# Patient Record
Sex: FEMALE | Race: WHITE | NOT HISPANIC OR LATINO | URBAN - METROPOLITAN AREA
[De-identification: names, ages, dates, MRNs, and addresses within clinical notes are randomized per-mention and may not be internally consistent; named-entity substitution may affect disease eponyms.]

---

## 2018-04-10 ENCOUNTER — OUTPATIENT (OUTPATIENT)
Dept: OUTPATIENT SERVICES | Facility: HOSPITAL | Age: 63
LOS: 1 days | Discharge: HOME | End: 2018-04-10

## 2018-04-19 ENCOUNTER — INPATIENT (INPATIENT)
Facility: HOSPITAL | Age: 63
LOS: 3 days | Discharge: HOME | DRG: 863 | End: 2018-04-23
Attending: INTERNAL MEDICINE | Admitting: INTERNAL MEDICINE
Payer: MEDICARE

## 2018-04-19 VITALS
TEMPERATURE: 98 F | RESPIRATION RATE: 18 BRPM | HEART RATE: 75 BPM | OXYGEN SATURATION: 98 % | HEIGHT: 66 IN | DIASTOLIC BLOOD PRESSURE: 84 MMHG | SYSTOLIC BLOOD PRESSURE: 134 MMHG | WEIGHT: 179.02 LBS

## 2018-04-19 DIAGNOSIS — I10 ESSENTIAL (PRIMARY) HYPERTENSION: ICD-10-CM

## 2018-04-19 DIAGNOSIS — Z29.9 ENCOUNTER FOR PROPHYLACTIC MEASURES, UNSPECIFIED: ICD-10-CM

## 2018-04-19 DIAGNOSIS — Z98.890 OTHER SPECIFIED POSTPROCEDURAL STATES: Chronic | ICD-10-CM

## 2018-04-19 DIAGNOSIS — T81.9XXA UNSPECIFIED COMPLICATION OF PROCEDURE, INITIAL ENCOUNTER: ICD-10-CM

## 2018-04-19 DIAGNOSIS — R63.8 OTHER SYMPTOMS AND SIGNS CONCERNING FOOD AND FLUID INTAKE: ICD-10-CM

## 2018-04-19 LAB
ALBUMIN SERPL ELPH-MCNC: 4.3 G/DL — SIGNIFICANT CHANGE UP (ref 3.3–5)
ALP SERPL-CCNC: 77 U/L — SIGNIFICANT CHANGE UP (ref 40–120)
ALT FLD-CCNC: 16 U/L — SIGNIFICANT CHANGE UP (ref 10–45)
ANION GAP SERPL CALC-SCNC: 11 MMOL/L — SIGNIFICANT CHANGE UP (ref 5–17)
APTT BLD: 34.3 SEC — SIGNIFICANT CHANGE UP (ref 27.5–37.4)
AST SERPL-CCNC: 32 U/L — SIGNIFICANT CHANGE UP (ref 10–40)
BASOPHILS NFR BLD AUTO: 0.2 % — SIGNIFICANT CHANGE UP (ref 0–2)
BILIRUB SERPL-MCNC: 0.3 MG/DL — SIGNIFICANT CHANGE UP (ref 0.2–1.2)
BLD GP AB SCN SERPL QL: NEGATIVE — SIGNIFICANT CHANGE UP
BUN SERPL-MCNC: 14 MG/DL — SIGNIFICANT CHANGE UP (ref 7–23)
CALCIUM SERPL-MCNC: 9.6 MG/DL — SIGNIFICANT CHANGE UP (ref 8.4–10.5)
CHLORIDE SERPL-SCNC: 100 MMOL/L — SIGNIFICANT CHANGE UP (ref 96–108)
CO2 SERPL-SCNC: 29 MMOL/L — SIGNIFICANT CHANGE UP (ref 22–31)
CREAT SERPL-MCNC: 0.6 MG/DL — SIGNIFICANT CHANGE UP (ref 0.5–1.3)
CRP SERPL-MCNC: 0.22 MG/DL — SIGNIFICANT CHANGE UP (ref 0–0.4)
EOSINOPHIL NFR BLD AUTO: 2.4 % — SIGNIFICANT CHANGE UP (ref 0–6)
ERYTHROCYTE [SEDIMENTATION RATE] IN BLOOD: 12 MM/HR — SIGNIFICANT CHANGE UP
GLUCOSE SERPL-MCNC: 132 MG/DL — HIGH (ref 70–99)
HCT VFR BLD CALC: 43.4 % — SIGNIFICANT CHANGE UP (ref 34.5–45)
HGB BLD-MCNC: 14.8 G/DL — SIGNIFICANT CHANGE UP (ref 11.5–15.5)
INR BLD: 0.97 — SIGNIFICANT CHANGE UP (ref 0.88–1.16)
LYMPHOCYTES # BLD AUTO: 29.8 % — SIGNIFICANT CHANGE UP (ref 13–44)
MCHC RBC-ENTMCNC: 32.2 PG — SIGNIFICANT CHANGE UP (ref 27–34)
MCHC RBC-ENTMCNC: 34.1 G/DL — SIGNIFICANT CHANGE UP (ref 32–36)
MCV RBC AUTO: 94.6 FL — SIGNIFICANT CHANGE UP (ref 80–100)
MONOCYTES NFR BLD AUTO: 6.6 % — SIGNIFICANT CHANGE UP (ref 2–14)
NEUTROPHILS NFR BLD AUTO: 61 % — SIGNIFICANT CHANGE UP (ref 43–77)
PLATELET # BLD AUTO: 177 K/UL — SIGNIFICANT CHANGE UP (ref 150–400)
POTASSIUM SERPL-MCNC: 4 MMOL/L — SIGNIFICANT CHANGE UP (ref 3.5–5.3)
POTASSIUM SERPL-SCNC: 4 MMOL/L — SIGNIFICANT CHANGE UP (ref 3.5–5.3)
PROT SERPL-MCNC: 6.8 G/DL — SIGNIFICANT CHANGE UP (ref 6–8.3)
PROTHROM AB SERPL-ACNC: 10.8 SEC — SIGNIFICANT CHANGE UP (ref 9.8–12.7)
RBC # BLD: 4.59 M/UL — SIGNIFICANT CHANGE UP (ref 3.8–5.2)
RBC # FLD: 12.2 % — SIGNIFICANT CHANGE UP (ref 10.3–16.9)
RH IG SCN BLD-IMP: POSITIVE — SIGNIFICANT CHANGE UP
SODIUM SERPL-SCNC: 140 MMOL/L — SIGNIFICANT CHANGE UP (ref 135–145)
WBC # BLD: 5.3 K/UL — SIGNIFICANT CHANGE UP (ref 3.8–10.5)
WBC # FLD AUTO: 5.3 K/UL — SIGNIFICANT CHANGE UP (ref 3.8–10.5)

## 2018-04-19 PROCEDURE — 71046 X-RAY EXAM CHEST 2 VIEWS: CPT | Mod: 26

## 2018-04-19 PROCEDURE — 99285 EMERGENCY DEPT VISIT HI MDM: CPT

## 2018-04-19 PROCEDURE — 73630 X-RAY EXAM OF FOOT: CPT | Mod: 26,LT

## 2018-04-19 RX ORDER — PIPERACILLIN AND TAZOBACTAM 4; .5 G/20ML; G/20ML
3.38 INJECTION, POWDER, LYOPHILIZED, FOR SOLUTION INTRAVENOUS EVERY 6 HOURS
Qty: 0 | Refills: 0 | Status: DISCONTINUED | OUTPATIENT
Start: 2018-04-19 | End: 2018-04-22

## 2018-04-19 RX ORDER — PIPERACILLIN AND TAZOBACTAM 4; .5 G/20ML; G/20ML
3.38 INJECTION, POWDER, LYOPHILIZED, FOR SOLUTION INTRAVENOUS ONCE
Qty: 0 | Refills: 0 | Status: COMPLETED | OUTPATIENT
Start: 2018-04-19 | End: 2018-04-19

## 2018-04-19 RX ORDER — MORPHINE SULFATE 50 MG/1
4 CAPSULE, EXTENDED RELEASE ORAL ONCE
Qty: 0 | Refills: 0 | Status: DISCONTINUED | OUTPATIENT
Start: 2018-04-19 | End: 2018-04-19

## 2018-04-19 RX ORDER — VALSARTAN 80 MG/1
1 TABLET ORAL
Qty: 0 | Refills: 0 | COMMUNITY

## 2018-04-19 RX ORDER — VANCOMYCIN HCL 1 G
1250 VIAL (EA) INTRAVENOUS EVERY 12 HOURS
Qty: 0 | Refills: 0 | Status: DISCONTINUED | OUTPATIENT
Start: 2018-04-20 | End: 2018-04-20

## 2018-04-19 RX ORDER — VALSARTAN 80 MG/1
40 TABLET ORAL DAILY
Qty: 0 | Refills: 0 | Status: DISCONTINUED | OUTPATIENT
Start: 2018-04-20 | End: 2018-04-23

## 2018-04-19 RX ORDER — VANCOMYCIN HCL 1 G
1000 VIAL (EA) INTRAVENOUS ONCE
Qty: 0 | Refills: 0 | Status: COMPLETED | OUTPATIENT
Start: 2018-04-19 | End: 2018-04-19

## 2018-04-19 RX ORDER — DOCUSATE SODIUM 100 MG
100 CAPSULE ORAL DAILY
Qty: 0 | Refills: 0 | Status: DISCONTINUED | OUTPATIENT
Start: 2018-04-19 | End: 2018-04-23

## 2018-04-19 RX ORDER — MORPHINE SULFATE 50 MG/1
2 CAPSULE, EXTENDED RELEASE ORAL ONCE
Qty: 0 | Refills: 0 | Status: DISCONTINUED | OUTPATIENT
Start: 2018-04-19 | End: 2018-04-19

## 2018-04-19 RX ORDER — ACETAMINOPHEN 500 MG
650 TABLET ORAL EVERY 6 HOURS
Qty: 0 | Refills: 0 | Status: DISCONTINUED | OUTPATIENT
Start: 2018-04-19 | End: 2018-04-23

## 2018-04-19 RX ADMIN — PIPERACILLIN AND TAZOBACTAM 200 GRAM(S): 4; .5 INJECTION, POWDER, LYOPHILIZED, FOR SOLUTION INTRAVENOUS at 13:49

## 2018-04-19 RX ADMIN — Medication 650 MILLIGRAM(S): at 23:39

## 2018-04-19 RX ADMIN — PIPERACILLIN AND TAZOBACTAM 200 GRAM(S): 4; .5 INJECTION, POWDER, LYOPHILIZED, FOR SOLUTION INTRAVENOUS at 19:35

## 2018-04-19 RX ADMIN — Medication 250 MILLIGRAM(S): at 14:32

## 2018-04-19 RX ADMIN — MORPHINE SULFATE 4 MILLIGRAM(S): 50 CAPSULE, EXTENDED RELEASE ORAL at 13:49

## 2018-04-19 RX ADMIN — MORPHINE SULFATE 4 MILLIGRAM(S): 50 CAPSULE, EXTENDED RELEASE ORAL at 14:33

## 2018-04-19 RX ADMIN — MORPHINE SULFATE 2 MILLIGRAM(S): 50 CAPSULE, EXTENDED RELEASE ORAL at 16:56

## 2018-04-19 RX ADMIN — Medication 650 MILLIGRAM(S): at 16:56

## 2018-04-19 NOTE — ED ADULT TRIAGE NOTE - CHIEF COMPLAINT QUOTE
PT directed to ED for Abx for Cellulitis to Left First Toe.  Pt states "I've been on Augmentin but I guess its not working."  PT denies Dizziness, N/V/D, SOB, Fevers and CP.

## 2018-04-19 NOTE — H&P ADULT - ASSESSMENT
63yo F w/ PMHx of HTN presented for L toe pain, being admitted for post-surgical infection s/p hallux IPJ fusion and hammertoe correction.

## 2018-04-19 NOTE — ED ADULT NURSE NOTE - OBJECTIVE STATEMENT
PT came to ED under direction of PCP for cellulitis of left 1st toe and toe pain. Pt had recent surgery on left 1st, 2nd, 3rd toes.  1st toe is swollen, painful.  Pt denies fever, chills, dizziness, nausea, vomiting. Positive PMS x4 extremities. Pt on augmentin.  Pt denies all other medical complaints at this time.

## 2018-04-19 NOTE — ED PROVIDER NOTE - MUSCULOSKELETAL, MLM
L foot: pedal pulse 2+, external fixators to 2nd-4th digits, 1st digit w/mod swelling, + erythema, + warmth, sutures in place, nail L foot: pedal pulse 2+, external fixators to 2nd-4th digits, 1st digit w/mod swelling, + erythema, + warmth, sutures in place, nail partially excised, + purulent discharge, + light touch, no ascending lymphangitis

## 2018-04-19 NOTE — ED CLERICAL - NS ED CLERK NOTE PRE-ARRIVAL INFORMATION; ADDITIONAL PRE-ARRIVAL INFORMATION
61 Y/O F SHEY STOCK BEING SRNT IN BY THEODORE SANTOYO FOR POST OP INFECTION LEFT BIG TOE WAS ON ANTIBIOTICS FAILED DR KESSLER PT PLEASE CALL PODIATRY RESIDENT

## 2018-04-19 NOTE — ED PROVIDER NOTE - OBJECTIVE STATEMENT
The pt is a 61 y/o F, who was sent to ED by podiatry - The pt is a 61 y/o F, who was sent to ED by podiatry for post op inf - had toe fusion surg on 4/10 by dr piña, 2 d ago L big toe got red and swollen - was placed on augmentin 875 mg, today toe looking worse hence sent to ED. Pt c/o 8/10, constant, throbbing pain, + discharge from big toe. Denies fevers, chills, injury, streaking up the leg, calf pain or swelling

## 2018-04-19 NOTE — ED ADULT NURSE NOTE - CHPI ED SYMPTOMS NEG
no bruising/no scaly patches on skin/no decreased eating/drinking/no chills/no vomiting/no petechia/no fever

## 2018-04-19 NOTE — H&P ADULT - NSHPPHYSICALEXAM_GEN_ALL_CORE
.  VITAL SIGNS:  T(C): 36.5 (04-19-18 @ 16:12), Max: 36.6 (04-19-18 @ 12:43)  T(F): 97.7 (04-19-18 @ 16:12), Max: 97.9 (04-19-18 @ 12:43)  HR: 68 (04-19-18 @ 16:12) (68 - 75)  BP: 131/79 (04-19-18 @ 16:12) (131/79 - 134/84)  BP(mean): --  RR: 18 (04-19-18 @ 16:12) (18 - 18)  SpO2: 98% (04-19-18 @ 16:12) (98% - 98%)  Wt(kg): --    PHYSICAL EXAM:    Constitutional: WDWN resting comfortably in bed; NAD  Head: NC/AT  Eyes: PERRL, EOMI  ENT: no nasal discharge; uvula midline, no oropharyngeal erythema or exudates; MMM  Neck: supple; no JVD or thyromegaly  Respiratory: CTA B/L; no W/R/R, no retractions  Cardiac: +S1/S2; RRR; no M/R/G; PMI non-displaced  Gastrointestinal: soft, NT/ND; no rebound or guarding; +BSx4  Back: spine midline, no bony tenderness or step-offs; no CVAT B/L  Extremities: WWP, L foot in boot with pins along toes 2-4, mild erythema and TTP   Musculoskeletal: NROM x4; no joint swelling, tenderness or erythema  Vascular: 2+ radial, DP pulses B/L  Dermatologic: skin warm, dry and intact; no rashes, wounds, or scars  Lymphatic: no submandibular or cervical LAD  Neurologic: AAOx3; CNII-XII grossly intact; no focal deficits  Psychiatric: affect and characteristics of appearance, verbalizations, behaviors are appropriate

## 2018-04-19 NOTE — H&P ADULT - HISTORY OF PRESENT ILLNESS
63yo F w/ PMhx of COPD presents for    In ED, T 97.9F, HR 75, /84, RR 18, 98% on RA. Podiatry consulted who diagnosed pt with post-surgical infection of l hallux s/p IPJ fusion and hammertoe correction. Gave matt and sandi. 63yo F w/ PMHx of HTN presented for L toe pain. Pt recently underwent L foot surgery on 4/10 which involved a hallux IPJ fusion and hammertoe correction, but had persistent pain despite being discharged on percocet. Pt describes the pain as throbbing, stabbing and non-radiating. Pt denies nausea, vomiting, fever, chills, diarrhea, night sweats.     In ED, T 97.9F, HR 75, /84, RR 18, 98% on RA. Podiatry consulted who diagnosed pt with post-surgical infection of l hallux s/p IPJ fusion and hammertoe correction. Gale gutierrez and sandi.

## 2018-04-19 NOTE — ED PROVIDER NOTE - MEDICAL DECISION MAKING DETAILS
pt w/post op inf of 1st L digit, failed outpatient tx (worsening symptoms on abx), no ascending lymphangitis, no signs of osteo, labs done, pt cultured, seen by podiatry - will follow but to be admitted to [facility] for iv abx. pt non toxic, hemodynamically stable. given iv abx and pain controlled

## 2018-04-19 NOTE — CONSULT NOTE ADULT - SUBJECTIVE AND OBJECTIVE BOX
Attending: Dr. Leon    Patient is a 62y old  Female who presents with a chief complaint of     HPI:  Patient is a 62yF who presents to the ED with pain and swelling in her left hallux. Sh    Review of systems negative except per HPI    PAST MEDICAL & SURGICAL HISTORY:  H/O toe surgery    Home Medications:    Allergies    No Known Allergies    Intolerances      FAMILY HISTORY:    Social History:     LABS                Vital Signs Last 24 Hrs  T(C): 36.6 (19 Apr 2018 12:43), Max: 36.6 (19 Apr 2018 12:43)  T(F): 97.9 (19 Apr 2018 12:43), Max: 97.9 (19 Apr 2018 12:43)  HR: 75 (19 Apr 2018 12:43) (75 - 75)  BP: 134/84 (19 Apr 2018 12:43) (134/84 - 134/84)  BP(mean): --  RR: 18 (19 Apr 2018 12:43) (18 - 18)  SpO2: 98% (19 Apr 2018 12:43) (98% - 98%)    PHYSICAL EXAM  General: NAD, AA0x3    Lower Extremity Focused:  Vasc:  Derm:  Neuro:  MSK:     RADIOLOGY Attending: Dr. Leon    Patient is a 62y old  Female who presents with a chief complaint of left hallux infection    HPI:  Patient is a 62yF with PMH of COPD presents to the ED with pain and swelling in her left hallux and was sent in by her outpatient podiatrist Dr. Leon. She had surgery on her left foot on April 10th including hallux IPJ fusion and hammertoe correction 2-4 with pins still in place. She reports that the pain is only in her left hallux and she is not having pain in toes 2-4. She has been on Augmentin for the past 7 days and notes that there hasn't been any improvement. She followed up with her podiatrist who sent her to the ED for admission for IV abx. She states that she has remained off of her foot as much as possible during the postoperative period and she has been taking Tylenol for the pain. She denies nausea, vomiting, fevers, or chills.     Review of systems negative except per HPI    PAST MEDICAL & SURGICAL HISTORY:  H/O toe surgery  R total knee replacement 2014  L total knee replacement 2004  R eye enucleation 2009  R Hip replacement 2015  L foot fracture 2016    Medications:  Baby aspirin  Valsartan 40mg      Allergies    No Known Allergies    Intolerances      FAMILY HISTORY:  Mother- Breast cancer  Father- Heart disease    Social History:   Former smoker, stopped 13 years  ago  Alcohol- 3 drinks per year  LABS                Vital Signs Last 24 Hrs  T(C): 36.6 (19 Apr 2018 12:43), Max: 36.6 (19 Apr 2018 12:43)  T(F): 97.9 (19 Apr 2018 12:43), Max: 97.9 (19 Apr 2018 12:43)  HR: 75 (19 Apr 2018 12:43) (75 - 75)  BP: 134/84 (19 Apr 2018 12:43) (134/84 - 134/84)  BP(mean): --  RR: 18 (19 Apr 2018 12:43) (18 - 18)  SpO2: 98% (19 Apr 2018 12:43) (98% - 98%)    PHYSICAL EXAM  General: NAD, AA0x3    Lower Extremity Focused:  Vasc: DP and PT pulses 2/4 b/l. CFT <3 sec. Edema and erythema noted to the left hallux.  Derm: Left hallux with erythema and edema especially around surgical incision site. Incision site is well coapted with sutures in place. Left hallux lateral nail fold with dried drainage. No active drainage identified currently. No malodor, no open wounds. Incision sites located on dorsal aspect of left toes 2-4 that are well coapted with sutures intact and no erythema or edema. Pins in place in toes 2-4, no erythema or edema around pin sites.  Neuro: Protective sensation intact b/l  MSK: TTP of left hallux, minimal TTP of left toes 2-3.     RADIOLOGY Attending: Dr. Leon    Patient is a 62y old  Female who presents with a chief complaint of left hallux infection    HPI:  Patient is a 62yF with PMH of COPD presents to the ED with pain and swelling in her left hallux and was sent in by her outpatient podiatrist Dr. Leon. She had surgery on her left foot on April 10th including hallux IPJ fusion and hammertoe correction 2-4 with pins still in place. She reports that the pain is only in her left hallux and she is not having pain in toes 2-4. She has been on Augmentin for the past 7 days and notes that there hasn't been any improvement. She followed up with her podiatrist who sent her to the ED for admission for IV abx. She states that she has remained off of her foot as much as possible during the postoperative period and she has been taking Tylenol for the pain. She denies nausea, vomiting, fevers, or chills.     Review of systems negative except per HPI    PAST MEDICAL & SURGICAL HISTORY:  L hallux IPJ fusion and hammertoes 2-4 correction April 10,2018  R total knee replacement 2014  L total knee replacement 2004  R eye enucleation 2009  R Hip replacement 2015  L foot fracture 2016    Medications:  Baby aspirin  Valsartan 40mg      Allergies    No Known Allergies    Intolerances      FAMILY HISTORY:  Mother- Breast cancer  Father- Heart disease    Social History:   Former smoker, stopped 13 years  ago  Alcohol- 3 drinks per year      LABS:   CBC Full  -  ( 19 Apr 2018 13:35 )  WBC Count : 5.3 K/uL  Hemoglobin : 14.8 g/dL  Hematocrit : 43.4 %  Platelet Count - Automated : 177 K/uL  Mean Cell Volume : 94.6 fL  Mean Cell Hemoglobin : 32.2 pg  Mean Cell Hemoglobin Concentration : 34.1 g/dL  Auto Neutrophil # : x  Auto Lymphocyte # : x  Auto Monocyte # : x  Auto Eosinophil # : x  Auto Basophil # : x  Auto Neutrophil % : 61.0 %  Auto Lymphocyte % : 29.8 %  Auto Monocyte % : 6.6 %  Auto Eosinophil % : 2.4 %  Auto Basophil % : 0.2 %  	                       14.8   5.3   )-----------( 177      ( 19 Apr 2018 13:35 )             43.4         PT/INR - ( 19 Apr 2018 13:35 )   PT: 10.8 sec;   INR: 0.97          PTT - ( 19 Apr 2018 13:35 )  PTT:34.3 sec        Vital Signs Last 24 Hrs  T(C): 36.6 (19 Apr 2018 12:43), Max: 36.6 (19 Apr 2018 12:43)  T(F): 97.9 (19 Apr 2018 12:43), Max: 97.9 (19 Apr 2018 12:43)  HR: 75 (19 Apr 2018 12:43) (75 - 75)  BP: 134/84 (19 Apr 2018 12:43) (134/84 - 134/84)  BP(mean): --  RR: 18 (19 Apr 2018 12:43) (18 - 18)  SpO2: 98% (19 Apr 2018 12:43) (98% - 98%)    PHYSICAL EXAM  General: NAD, AA0x3  Lower Extremity Focused:  Vasc: DP and PT pulses 2/4 b/l. CFT <3 sec. Edema and erythema noted to the left hallux.  Derm: Left hallux with erythema and edema especially around surgical incision site. Incision site is well coapted with sutures in place. Left hallux lateral nail fold with dried drainage. No active drainage identified currently. No malodor, no open wounds. Incision sites located on dorsal aspect of left toes 2-4 that are well coapted with sutures intact and no erythema or edema. Pins in place in toes 2-4, no erythema or edema around pin sites.  Neuro: Protective sensation intact b/l  MSK: TTP of left hallux, minimal TTP of left toes 2-3.     RADIOLOGY Attending: Dr. Leon    Patient is a 62y old  Female who presents with a chief complaint of left hallux infection    HPI:  Patient is a 62yF with PMH of COPD presents to the ED with pain and swelling in her left hallux and was sent in by her outpatient podiatrist Dr. Leon. She had surgery on her left foot on April 10th including hallux IPJ fusion and hammertoe correction 2-4 with pins still in place. She reports that the pain is only in her left hallux and she is not having pain in toes 2-4. She has been on Augmentin for the past 7 days and notes that there hasn't been any improvement. She followed up with her podiatrist who sent her to the ED for admission for IV abx. She states that she has remained off of her foot as much as possible during the postoperative period and she has been taking Tylenol for the pain. She denies nausea, vomiting, fevers, or chills.     Review of systems negative except per HPI    PAST MEDICAL & SURGICAL HISTORY:  L hallux IPJ fusion and hammertoes 2-4 correction April 10,2018  R total knee replacement 2014  L total knee replacement 2004  R eye enucleation 2009  R Hip replacement 2015  L foot fracture 2016    Medications:  Baby aspirin  Valsartan 40mg      Allergies    No Known Allergies    Intolerances      FAMILY HISTORY:  Mother- Breast cancer  Father- Heart disease    Social History:   Former smoker, stopped 13 years  ago  Alcohol- 3 drinks per year      LABS:                           14.8   5.3   )-----------( 177      ( 19 Apr 2018 13:35 )             43.4         140  |  100  |  14  ----------------------------<  132<H>  4.0   |  29  |  0.60    Ca    9.6      19 Apr 2018 13:35    TPro  6.8  /  Alb  4.3  /  TBili  0.3  /  DBili  x   /  AST  32  /  ALT  16  /  AlkPhos  77  04-19        PT/INR - ( 19 Apr 2018 13:35 )   PT: 10.8 sec;   INR: 0.97          PTT - ( 19 Apr 2018 13:35 )  PTT:34.3 sec        Vital Signs Last 24 Hrs  T(C): 36.6 (19 Apr 2018 12:43), Max: 36.6 (19 Apr 2018 12:43)  T(F): 97.9 (19 Apr 2018 12:43), Max: 97.9 (19 Apr 2018 12:43)  HR: 75 (19 Apr 2018 12:43) (75 - 75)  BP: 134/84 (19 Apr 2018 12:43) (134/84 - 134/84)  BP(mean): --  RR: 18 (19 Apr 2018 12:43) (18 - 18)  SpO2: 98% (19 Apr 2018 12:43) (98% - 98%)    PHYSICAL EXAM  General: NAD, AA0x3  Lower Extremity Focused:  Vasc: DP and PT pulses 2/4 b/l. CFT <3 sec. Edema and erythema noted to the left hallux.  Derm: Left hallux with erythema and edema especially around surgical incision site. Incision site is well coapted with sutures in place. Left hallux lateral nail fold with dried drainage. No active drainage identified currently. No malodor, no open wounds. Incision sites located on dorsal aspect of left toes 2-4 that are well coapted with sutures intact and no erythema or edema. Pins in place in toes 2-4, no erythema or edema around pin sites.  Neuro: Protective sensation intact b/l  MSK: TTP of left hallux, minimal TTP of left toes 2-3.     RADIOLOGY Attending: Dr. Leon    Patient is a 62y old  Female who presents with a chief complaint of left hallux infection    HPI:  Patient is a 62yF with PMH of COPD presents to the ED with pain and swelling in her left hallux and was sent in by her outpatient podiatrist Dr. Leon. She had surgery on her left foot on April 10th including hallux IPJ fusion and hammertoe correction 2-4 with pins still in place. She reports that the pain is only in her left hallux and she is not having pain in toes 2-4. She has been on Augmentin for the past 7 days and notes that there hasn't been any improvement. She followed up with her podiatrist who sent her to the ED for admission for IV abx. She states that she has remained off of her foot as much as possible during the postoperative period and she has been taking Tylenol for the pain. She denies nausea, vomiting, fevers, or chills.     Review of systems negative except per HPI    PAST MEDICAL & SURGICAL HISTORY:  L hallux IPJ fusion and hammertoes 2-4 correction April 10,2018  R total knee replacement 2014  L total knee replacement 2004  R eye enucleation 2009  R Hip replacement 2015  L foot fracture 2016    Medications:  Baby aspirin  Valsartan 40mg      Allergies    No Known Allergies    Intolerances      FAMILY HISTORY:  Mother- Breast cancer  Father- Heart disease    Social History:   Former smoker, stopped 13 years  ago  Alcohol- 3 drinks per year      LABS:                           14.8   5.3   )-----------( 177      ( 19 Apr 2018 13:35 )             43.4         140  |  100  |  14  ----------------------------<  132<H>  4.0   |  29  |  0.60    Ca    9.6      19 Apr 2018 13:35    TPro  6.8  /  Alb  4.3  /  TBili  0.3  /  DBili  x   /  AST  32  /  ALT  16  /  AlkPhos  77  04-19        PT/INR - ( 19 Apr 2018 13:35 )   PT: 10.8 sec;   INR: 0.97          PTT - ( 19 Apr 2018 13:35 )  PTT:34.3 sec        Vital Signs Last 24 Hrs  T(C): 36.6 (19 Apr 2018 12:43), Max: 36.6 (19 Apr 2018 12:43)  T(F): 97.9 (19 Apr 2018 12:43), Max: 97.9 (19 Apr 2018 12:43)  HR: 75 (19 Apr 2018 12:43) (75 - 75)  BP: 134/84 (19 Apr 2018 12:43) (134/84 - 134/84)  BP(mean): --  RR: 18 (19 Apr 2018 12:43) (18 - 18)  SpO2: 98% (19 Apr 2018 12:43) (98% - 98%)    PHYSICAL EXAM  General: NAD, AA0x3  Lower Extremity Focused:  Vasc: DP and PT pulses 2/4 b/l. CFT <3 sec. Edema and erythema noted to the left hallux.  Derm: Left hallux with erythema and edema especially around surgical incision site extending from the level of the 1st MPJ distally. Incision site is well coapted with sutures in place. Left hallux lateral nail fold with dried drainage. No active drainage identified currently. No malodor, no open wounds. Incision sites located on dorsal aspect of left toes 2-4 that are well coapted with sutures intact and no erythema or edema. Pins in place in toes 2-4, no erythema or edema around pin sites.  Neuro: Protective sensation intact b/l  MSK: TTP of left hallux, minimal TTP of left toes 2-3.     RADIOLOGY Attending: Dr. Leon    Patient is a 62y old  Female who presents with a chief complaint of left hallux infection    HPI:  Patient is a 62yF with PMH of COPD presents to the ED with pain and swelling in her left hallux and was sent in by her outpatient podiatrist Dr. Leon. She had surgery on her left foot on April 10th including hallux IPJ fusion and hammertoe correction 2-4 with pins still in place. She reports that the pain is only in her left hallux and she is not having pain in toes 2-4. She has been on Augmentin for the past 7 days and notes that there hasn't been any improvement. She followed up with her podiatrist who sent her to the ED for admission for IV abx. She states that she has remained off of her foot as much as possible during the postoperative period and she has been taking Tylenol for the pain. She denies nausea, vomiting, fevers, or chills.     Review of systems negative except per HPI    PAST MEDICAL & SURGICAL HISTORY:  L hallux IPJ fusion and hammertoes 2-4 correction April 10,2018  R total knee replacement 2014  L total knee replacement 2004  R eye enucleation 2009  R Hip replacement 2015  L foot fracture 2016    Medications:  Baby aspirin  Valsartan 40mg      Allergies    No Known Allergies    Intolerances      FAMILY HISTORY:  Mother- Breast cancer  Father- Heart disease    Social History:   Former smoker, stopped 13 years  ago  Alcohol- 3 drinks per year      LABS:                           14.8   5.3   )-----------( 177      ( 19 Apr 2018 13:35 )             43.4         140  |  100  |  14  ----------------------------<  132<H>  4.0   |  29  |  0.60    Ca    9.6      19 Apr 2018 13:35    TPro  6.8  /  Alb  4.3  /  TBili  0.3  /  DBili  x   /  AST  32  /  ALT  16  /  AlkPhos  77  04-19        PT/INR - ( 19 Apr 2018 13:35 )   PT: 10.8 sec;   INR: 0.97          PTT - ( 19 Apr 2018 13:35 )  PTT:34.3 sec        Vital Signs Last 24 Hrs  T(C): 36.6 (19 Apr 2018 12:43), Max: 36.6 (19 Apr 2018 12:43)  T(F): 97.9 (19 Apr 2018 12:43), Max: 97.9 (19 Apr 2018 12:43)  HR: 75 (19 Apr 2018 12:43) (75 - 75)  BP: 134/84 (19 Apr 2018 12:43) (134/84 - 134/84)  BP(mean): --  RR: 18 (19 Apr 2018 12:43) (18 - 18)  SpO2: 98% (19 Apr 2018 12:43) (98% - 98%)    PHYSICAL EXAM  General: NAD, AA0x3  Lower Extremity Focused:  Vasc: DP and PT pulses 2/4 b/l. CFT <3 sec. Edema and erythema noted to the left hallux.  Derm: Left hallux with erythema and edema especially around surgical incision site extending from the level of the 1st MPJ distally. Incision site is well coapted with sutures in place. Left hallux lateral nail fold with dried drainage. No active drainage identified currently. No malodor, no open wounds. Incision sites located on dorsal aspect of left toes 2-4 that are well coapted with sutures intact and no erythema or edema. Pins in place in toes 2-4, no erythema or edema around pin sites.  Neuro: Protective sensation intact b/l  MSK: TTP of left hallux, minimal TTP of left toes 2-3.     RADIOLOGY  Left foot xrays 3 views: No soft tissue gas identified, surgical hardware intact, soft tissue edema to the left hallux Attending: Dr. Leon    Patient is a 62y old  Female who presents with a chief complaint of left hallux infection    HPI:  Patient is a 62yF with PMH of COPD presents to the ED with pain and swelling in her left hallux and was sent in by her outpatient podiatrist Dr. Leon. She had surgery on her left foot on April 10th including hallux IPJ fusion and hammertoe correction 2-4 with pins still in place. She reports that the pain is only in her left hallux and describes it as throbbing in nature rating it an 8/10 in nature and she is not having pain in toes 2-4. She states that there has been a small amount of drainage from the toe as well. She has been on Augmentin 875mg for the past 7 days and notes that there hasn't been any improvement. She followed up with Dr. Leon who sent her to the ED for admission for IV abx. She states that she has remained off of her foot as much as possible during the postoperative period and she has been taking Tylenol for the pain. She denies nausea, vomiting, fevers, or chills.     Review of systems negative except per HPI    PAST MEDICAL & SURGICAL HISTORY:  L hallux IPJ fusion and hammertoes 2-4 correction April 10,2018  R total knee replacement 2014  L total knee replacement 2004  R eye enucleation 2009  R Hip replacement 2015  L foot fracture 2016    Medications:  Baby aspirin  Valsartan 40mg      Allergies    No Known Allergies    Intolerances      FAMILY HISTORY:  Mother- Breast cancer  Father- Heart disease    Social History:   Former smoker, stopped 13 years  ago  Alcohol- 3 drinks per year      LABS:                           14.8   5.3   )-----------( 177      ( 19 Apr 2018 13:35 )             43.4         140  |  100  |  14  ----------------------------<  132<H>  4.0   |  29  |  0.60    Ca    9.6      19 Apr 2018 13:35    TPro  6.8  /  Alb  4.3  /  TBili  0.3  /  DBili  x   /  AST  32  /  ALT  16  /  AlkPhos  77  04-19        PT/INR - ( 19 Apr 2018 13:35 )   PT: 10.8 sec;   INR: 0.97          PTT - ( 19 Apr 2018 13:35 )  PTT:34.3 sec        Vital Signs Last 24 Hrs  T(C): 36.6 (19 Apr 2018 12:43), Max: 36.6 (19 Apr 2018 12:43)  T(F): 97.9 (19 Apr 2018 12:43), Max: 97.9 (19 Apr 2018 12:43)  HR: 75 (19 Apr 2018 12:43) (75 - 75)  BP: 134/84 (19 Apr 2018 12:43) (134/84 - 134/84)  BP(mean): --  RR: 18 (19 Apr 2018 12:43) (18 - 18)  SpO2: 98% (19 Apr 2018 12:43) (98% - 98%)    PHYSICAL EXAM  General: NAD, AA0x3  Lower Extremity Focused:  Vasc: DP and PT pulses 2/4 b/l. CFT <3 sec. Edema and erythema noted to the left hallux.  Derm: Left hallux with erythema and edema especially around surgical incision site extending from the level of the 1st MPJ distally. Incision site is well coapted with sutures in place. Left hallux lateral nail fold with dried drainage. No active drainage identified currently. No malodor, no open wounds. Incision sites located on dorsal aspect of left toes 2-4 that are well coapted with sutures intact and no erythema or edema. Pins in place in toes 2-4, no erythema or edema around pin sites.  Neuro: Protective sensation intact b/l  MSK: TTP of left hallux, minimal TTP of left toes 2-3.     RADIOLOGY  Left foot xrays 3 views: No soft tissue gas identified, surgical hardware intact, soft tissue edema to the left hallux

## 2018-04-19 NOTE — H&P ADULT - NSHPLABSRESULTS_GEN_ALL_CORE
.  LABS:                         14.8   5.3   )-----------( 177      ( 19 Apr 2018 13:35 )             43.4     04-19    140  |  100  |  14  ----------------------------<  132<H>  4.0   |  29  |  0.60    Ca    9.6      19 Apr 2018 13:35    TPro  6.8  /  Alb  4.3  /  TBili  0.3  /  DBili  x   /  AST  32  /  ALT  16  /  AlkPhos  77  04-19    PT/INR - ( 19 Apr 2018 13:35 )   PT: 10.8 sec;   INR: 0.97          PTT - ( 19 Apr 2018 13:35 )  PTT:34.3 sec              RADIOLOGY, EKG & ADDITIONAL TESTS: Reviewed.

## 2018-04-20 LAB
ANION GAP SERPL CALC-SCNC: 9 MMOL/L — SIGNIFICANT CHANGE UP (ref 5–17)
BUN SERPL-MCNC: 13 MG/DL — SIGNIFICANT CHANGE UP (ref 7–23)
CALCIUM SERPL-MCNC: 8.8 MG/DL — SIGNIFICANT CHANGE UP (ref 8.4–10.5)
CHLORIDE SERPL-SCNC: 104 MMOL/L — SIGNIFICANT CHANGE UP (ref 96–108)
CO2 SERPL-SCNC: 29 MMOL/L — SIGNIFICANT CHANGE UP (ref 22–31)
CREAT SERPL-MCNC: 0.65 MG/DL — SIGNIFICANT CHANGE UP (ref 0.5–1.3)
GLUCOSE SERPL-MCNC: 77 MG/DL — SIGNIFICANT CHANGE UP (ref 70–99)
HCT VFR BLD CALC: 40.1 % — SIGNIFICANT CHANGE UP (ref 34.5–45)
HCV AB S/CO SERPL IA: 0.18 S/CO — SIGNIFICANT CHANGE UP
HCV AB SERPL-IMP: SIGNIFICANT CHANGE UP
HGB BLD-MCNC: 13.4 G/DL — SIGNIFICANT CHANGE UP (ref 11.5–15.5)
MAGNESIUM SERPL-MCNC: 1.9 MG/DL — SIGNIFICANT CHANGE UP (ref 1.6–2.6)
MCHC RBC-ENTMCNC: 32.3 PG — SIGNIFICANT CHANGE UP (ref 27–34)
MCHC RBC-ENTMCNC: 33.4 G/DL — SIGNIFICANT CHANGE UP (ref 32–36)
MCV RBC AUTO: 96.6 FL — SIGNIFICANT CHANGE UP (ref 80–100)
PLATELET # BLD AUTO: 165 K/UL — SIGNIFICANT CHANGE UP (ref 150–400)
POTASSIUM SERPL-MCNC: 3.4 MMOL/L — LOW (ref 3.5–5.3)
POTASSIUM SERPL-SCNC: 3.4 MMOL/L — LOW (ref 3.5–5.3)
RBC # BLD: 4.15 M/UL — SIGNIFICANT CHANGE UP (ref 3.8–5.2)
RBC # FLD: 12.1 % — SIGNIFICANT CHANGE UP (ref 10.3–16.9)
SODIUM SERPL-SCNC: 142 MMOL/L — SIGNIFICANT CHANGE UP (ref 135–145)
VANCOMYCIN TROUGH SERPL-MCNC: 18 UG/ML — SIGNIFICANT CHANGE UP (ref 10–20)
WBC # BLD: 3.8 K/UL — SIGNIFICANT CHANGE UP (ref 3.8–10.5)
WBC # FLD AUTO: 3.8 K/UL — SIGNIFICANT CHANGE UP (ref 3.8–10.5)

## 2018-04-20 RX ORDER — POTASSIUM CHLORIDE 20 MEQ
40 PACKET (EA) ORAL EVERY 4 HOURS
Qty: 0 | Refills: 0 | Status: COMPLETED | OUTPATIENT
Start: 2018-04-20 | End: 2018-04-20

## 2018-04-20 RX ORDER — VANCOMYCIN HCL 1 G
1250 VIAL (EA) INTRAVENOUS EVERY 12 HOURS
Qty: 0 | Refills: 0 | Status: DISCONTINUED | OUTPATIENT
Start: 2018-04-21 | End: 2018-04-23

## 2018-04-20 RX ORDER — MORPHINE SULFATE 50 MG/1
2 CAPSULE, EXTENDED RELEASE ORAL ONCE
Qty: 0 | Refills: 0 | Status: DISCONTINUED | OUTPATIENT
Start: 2018-04-20 | End: 2018-04-20

## 2018-04-20 RX ADMIN — MORPHINE SULFATE 2 MILLIGRAM(S): 50 CAPSULE, EXTENDED RELEASE ORAL at 02:58

## 2018-04-20 RX ADMIN — PIPERACILLIN AND TAZOBACTAM 200 GRAM(S): 4; .5 INJECTION, POWDER, LYOPHILIZED, FOR SOLUTION INTRAVENOUS at 13:55

## 2018-04-20 RX ADMIN — Medication 650 MILLIGRAM(S): at 00:39

## 2018-04-20 RX ADMIN — Medication 166.67 MILLIGRAM(S): at 14:20

## 2018-04-20 RX ADMIN — Medication 100 MILLIGRAM(S): at 13:31

## 2018-04-20 RX ADMIN — Medication 650 MILLIGRAM(S): at 07:58

## 2018-04-20 RX ADMIN — VALSARTAN 40 MILLIGRAM(S): 80 TABLET ORAL at 06:54

## 2018-04-20 RX ADMIN — PIPERACILLIN AND TAZOBACTAM 200 GRAM(S): 4; .5 INJECTION, POWDER, LYOPHILIZED, FOR SOLUTION INTRAVENOUS at 19:09

## 2018-04-20 RX ADMIN — MORPHINE SULFATE 2 MILLIGRAM(S): 50 CAPSULE, EXTENDED RELEASE ORAL at 03:13

## 2018-04-20 RX ADMIN — Medication 650 MILLIGRAM(S): at 13:35

## 2018-04-20 RX ADMIN — Medication 40 MILLIEQUIVALENT(S): at 14:20

## 2018-04-20 RX ADMIN — Medication 166.67 MILLIGRAM(S): at 02:20

## 2018-04-20 RX ADMIN — PIPERACILLIN AND TAZOBACTAM 200 GRAM(S): 4; .5 INJECTION, POWDER, LYOPHILIZED, FOR SOLUTION INTRAVENOUS at 01:28

## 2018-04-20 RX ADMIN — Medication 650 MILLIGRAM(S): at 07:08

## 2018-04-20 RX ADMIN — Medication 650 MILLIGRAM(S): at 14:05

## 2018-04-20 RX ADMIN — PIPERACILLIN AND TAZOBACTAM 200 GRAM(S): 4; .5 INJECTION, POWDER, LYOPHILIZED, FOR SOLUTION INTRAVENOUS at 07:09

## 2018-04-20 RX ADMIN — Medication 40 MILLIEQUIVALENT(S): at 09:17

## 2018-04-20 NOTE — CONSULT NOTE ADULT - SUBJECTIVE AND OBJECTIVE BOX
HPI:  63yo F w/ PMHx of HTN presented for L toe pain. Pt recently underwent L foot surgery on 4/10 which involved a hallux IPJ fusion and hammertoe correction, but had persistent pain despite being discharged on percocet. Pt describes the pain as throbbing, stabbing and non-radiating. Pt denies nausea, vomiting, fever, chills, diarrhea, night sweats.     In ED, T 97.9F, HR 75, /84, RR 18, 98% on RA. Podiatry consulted who diagnosed pt with post-surgical infection of l hallux s/p IPJ fusion and hammertoe correction. Gave vanc and zosyn. (19 Apr 2018 16:09)    Subjectively: no change so far with abx.  No fevers; Infection noted on the first post op visit    PAST MEDICAL & SURGICAL HISTORY:  Essential hypertension  H/O toe surgery  	    MEDICATIONS  (STANDING):  docusate sodium 100 milliGRAM(s) Oral daily  piperacillin/tazobactam IVPB. 3.375 Gram(s) IV Intermittent every 6 hours  valsartan 40 milliGRAM(s) Oral daily    MEDICATIONS  (PRN):  acetaminophen   Tablet. 650 milliGRAM(s) Oral every 6 hours PRN Moderate Pain (4 - 6)      Allergies    No Known Allergies      SOCIAL HISTORY:  Past history of smoking    FAMILY HISTORY:  No pertinent family history in first degree relatives    EXAM  Vital Signs Last 24 Hrs  T(C): 36.7 (20 Apr 2018 16:05), Max: 36.9 (20 Apr 2018 09:51)  T(F): 98.1 (20 Apr 2018 16:05), Max: 98.4 (20 Apr 2018 09:51)  HR: 77 (20 Apr 2018 16:05) (67 - 85)  BP: 106/67 (20 Apr 2018 16:05) (106/67 - 148/92)  BP(mean): --  RR: 18 (20 Apr 2018 16:05) (16 - 18)  SpO2: 97% (20 Apr 2018 16:05) (94% - 97%)  On RA  Awake and alert  No rash  RRR  Chest CTA   Abd soft ND NT  LEs without edema  Left foot with pins and closed incisions on toes 2-4  Great toe with erythema      LABS:                        13.4   3.8   )-----------( 165      ( 20 Apr 2018 07:08 )             40.1     04-20    142  |  104  |  13  ----------------------------<  77  3.4<L>   |  29  |  0.65    Ca    8.8      20 Apr 2018 07:08  Mg     1.9     04-20    TPro  6.8  /  Alb  4.3  /  TBili  0.3  /  DBili  x   /  AST  32  /  ALT  16  /  AlkPhos  77  04-19    PT/INR - ( 19 Apr 2018 13:35 )   PT: 10.8 sec;   INR: 0.97          PTT - ( 19 Apr 2018 13:35 )  PTT:34.3 sec      Culture - Blood (04.19.18 @ 17:36)    Specimen Source: .Blood Blood    Culture Results:   No growth at 1 day.      Culture - Blood (04.19.18 @ 17:36)    Specimen Source: .Blood Blood    Culture Results:   No growth at 1 day.      RADIOLOGY & ADDITIONAL STUDIES:    Xray Foot AP + Lateral + Oblique, Left (04.19.18 @ 14:15) >    EXAM:  XR FOOT-LEFT MIN 3 VIEWS                          PROCEDURE DATE:  04/19/2018         INTERPRETATION:  Indication: post op inf    3 views of the left foot are submitted. No prior studies available for   comparison. There are postoperative changes in the first through fourth   digits. Soft tissue swelling is present. This is most pronounced in the   hallux. There is diffusely decreased mineralization and a pes planus   deformity.      Impression: Postoperative changes with soft tissue swelling. Infection cannot be excluded

## 2018-04-20 NOTE — CONSULT NOTE ADULT - ASSESSMENT
Left foot/great toe infection following great toe fusion and hummer toes surgery  Most likely pathogen is Staph aureus but cannot exclude others.  Culture from the surgical site/affected area would be helpful    RECOMMEND  Culture any drainage  Direct empiric therapy against Staph aureus including MRSA in the meantime with IV Vanco and Rifampin and monitor for clinical response.  Would d/c Pip-Tazo   Anticipate at least 6 weeks of antimicrobial therapy
62yF 9 days s/p left hallux IPJ fusion and hammertoe correction of toes 2-4 presents with infection of the left hallux.    -Admit to medicine under Dr. Langford   -Recommend broad spectrum IV antibiotics for left hallux infection  -Pain control per primary team  -Podiatry will follow

## 2018-04-20 NOTE — PROGRESS NOTE ADULT - PROBLEM SELECTOR PLAN 1
Pt being admitted for post-surgical complication s/p L foot surgery  - podiatry following  - afebrile  - labs wnl  - c/w vanc/zosyn per podiatry for one more day, likely d/c sunday on PO abx   - tylenol PRN for pain control Pt being admitted for post-surgical complication s/p L foot surgery  - podiatry following  - afebrile  - labs wnl  - c/w vanc/zosyn per podiatry for one more day, likely d/c sunday on PO abx   - vanc trough at 10 PM   - tylenol PRN for pain control

## 2018-04-21 LAB
ANION GAP SERPL CALC-SCNC: 11 MMOL/L — SIGNIFICANT CHANGE UP (ref 5–17)
BUN SERPL-MCNC: 11 MG/DL — SIGNIFICANT CHANGE UP (ref 7–23)
CALCIUM SERPL-MCNC: 9.2 MG/DL — SIGNIFICANT CHANGE UP (ref 8.4–10.5)
CHLORIDE SERPL-SCNC: 108 MMOL/L — SIGNIFICANT CHANGE UP (ref 96–108)
CO2 SERPL-SCNC: 26 MMOL/L — SIGNIFICANT CHANGE UP (ref 22–31)
CREAT SERPL-MCNC: 0.66 MG/DL — SIGNIFICANT CHANGE UP (ref 0.5–1.3)
GLUCOSE SERPL-MCNC: 89 MG/DL — SIGNIFICANT CHANGE UP (ref 70–99)
HCT VFR BLD CALC: 40.2 % — SIGNIFICANT CHANGE UP (ref 34.5–45)
HGB BLD-MCNC: 13.9 G/DL — SIGNIFICANT CHANGE UP (ref 11.5–15.5)
MAGNESIUM SERPL-MCNC: 1.9 MG/DL — SIGNIFICANT CHANGE UP (ref 1.6–2.6)
MCHC RBC-ENTMCNC: 32.8 PG — SIGNIFICANT CHANGE UP (ref 27–34)
MCHC RBC-ENTMCNC: 34.6 G/DL — SIGNIFICANT CHANGE UP (ref 32–36)
MCV RBC AUTO: 94.8 FL — SIGNIFICANT CHANGE UP (ref 80–100)
PLATELET # BLD AUTO: 159 K/UL — SIGNIFICANT CHANGE UP (ref 150–400)
POTASSIUM SERPL-MCNC: 3.9 MMOL/L — SIGNIFICANT CHANGE UP (ref 3.5–5.3)
POTASSIUM SERPL-SCNC: 3.9 MMOL/L — SIGNIFICANT CHANGE UP (ref 3.5–5.3)
RBC # BLD: 4.24 M/UL — SIGNIFICANT CHANGE UP (ref 3.8–5.2)
RBC # FLD: 12.3 % — SIGNIFICANT CHANGE UP (ref 10.3–16.9)
SODIUM SERPL-SCNC: 145 MMOL/L — SIGNIFICANT CHANGE UP (ref 135–145)
WBC # BLD: 3.7 K/UL — LOW (ref 3.8–10.5)
WBC # FLD AUTO: 3.7 K/UL — LOW (ref 3.8–10.5)

## 2018-04-21 RX ORDER — WATER 99.05 ML/100ML
1 SOLUTION OPHTHALMIC
Qty: 0 | Refills: 0 | Status: DISCONTINUED | OUTPATIENT
Start: 2018-04-21 | End: 2018-04-23

## 2018-04-21 RX ORDER — BACITRACIN ZINC 500 UNIT/G
1 OINTMENT IN PACKET (EA) TOPICAL DAILY
Qty: 0 | Refills: 0 | Status: DISCONTINUED | OUTPATIENT
Start: 2018-04-21 | End: 2018-04-23

## 2018-04-21 RX ORDER — ASPIRIN/CALCIUM CARB/MAGNESIUM 324 MG
81 TABLET ORAL DAILY
Qty: 0 | Refills: 0 | Status: DISCONTINUED | OUTPATIENT
Start: 2018-04-21 | End: 2018-04-23

## 2018-04-21 RX ADMIN — Medication 650 MILLIGRAM(S): at 19:51

## 2018-04-21 RX ADMIN — Medication 1 APPLICATION(S): at 10:17

## 2018-04-21 RX ADMIN — Medication 650 MILLIGRAM(S): at 05:51

## 2018-04-21 RX ADMIN — WATER 1 APPLICATION(S): 99.05 SOLUTION OPHTHALMIC at 10:16

## 2018-04-21 RX ADMIN — Medication 81 MILLIGRAM(S): at 11:44

## 2018-04-21 RX ADMIN — Medication 650 MILLIGRAM(S): at 13:00

## 2018-04-21 RX ADMIN — PIPERACILLIN AND TAZOBACTAM 200 GRAM(S): 4; .5 INJECTION, POWDER, LYOPHILIZED, FOR SOLUTION INTRAVENOUS at 13:33

## 2018-04-21 RX ADMIN — PIPERACILLIN AND TAZOBACTAM 200 GRAM(S): 4; .5 INJECTION, POWDER, LYOPHILIZED, FOR SOLUTION INTRAVENOUS at 00:26

## 2018-04-21 RX ADMIN — Medication 166.67 MILLIGRAM(S): at 14:09

## 2018-04-21 RX ADMIN — Medication 166.67 MILLIGRAM(S): at 02:20

## 2018-04-21 RX ADMIN — VALSARTAN 40 MILLIGRAM(S): 80 TABLET ORAL at 07:05

## 2018-04-21 RX ADMIN — Medication 650 MILLIGRAM(S): at 07:41

## 2018-04-21 RX ADMIN — Medication 650 MILLIGRAM(S): at 12:01

## 2018-04-21 RX ADMIN — PIPERACILLIN AND TAZOBACTAM 200 GRAM(S): 4; .5 INJECTION, POWDER, LYOPHILIZED, FOR SOLUTION INTRAVENOUS at 06:33

## 2018-04-21 RX ADMIN — Medication 100 MILLIGRAM(S): at 11:44

## 2018-04-21 RX ADMIN — PIPERACILLIN AND TAZOBACTAM 200 GRAM(S): 4; .5 INJECTION, POWDER, LYOPHILIZED, FOR SOLUTION INTRAVENOUS at 18:13

## 2018-04-21 RX ADMIN — Medication 650 MILLIGRAM(S): at 20:30

## 2018-04-22 LAB
ANION GAP SERPL CALC-SCNC: 9 MMOL/L — SIGNIFICANT CHANGE UP (ref 5–17)
BUN SERPL-MCNC: 15 MG/DL — SIGNIFICANT CHANGE UP (ref 7–23)
CALCIUM SERPL-MCNC: 9.7 MG/DL — SIGNIFICANT CHANGE UP (ref 8.4–10.5)
CHLORIDE SERPL-SCNC: 104 MMOL/L — SIGNIFICANT CHANGE UP (ref 96–108)
CO2 SERPL-SCNC: 30 MMOL/L — SIGNIFICANT CHANGE UP (ref 22–31)
CREAT SERPL-MCNC: 0.88 MG/DL — SIGNIFICANT CHANGE UP (ref 0.5–1.3)
GLUCOSE SERPL-MCNC: 95 MG/DL — SIGNIFICANT CHANGE UP (ref 70–99)
HCT VFR BLD CALC: 40.9 % — SIGNIFICANT CHANGE UP (ref 34.5–45)
HGB BLD-MCNC: 14.1 G/DL — SIGNIFICANT CHANGE UP (ref 11.5–15.5)
MAGNESIUM SERPL-MCNC: 1.9 MG/DL — SIGNIFICANT CHANGE UP (ref 1.6–2.6)
MCHC RBC-ENTMCNC: 32.7 PG — SIGNIFICANT CHANGE UP (ref 27–34)
MCHC RBC-ENTMCNC: 34.5 G/DL — SIGNIFICANT CHANGE UP (ref 32–36)
MCV RBC AUTO: 94.9 FL — SIGNIFICANT CHANGE UP (ref 80–100)
PLATELET # BLD AUTO: 157 K/UL — SIGNIFICANT CHANGE UP (ref 150–400)
POTASSIUM SERPL-MCNC: 4.6 MMOL/L — SIGNIFICANT CHANGE UP (ref 3.5–5.3)
POTASSIUM SERPL-SCNC: 4.6 MMOL/L — SIGNIFICANT CHANGE UP (ref 3.5–5.3)
RBC # BLD: 4.31 M/UL — SIGNIFICANT CHANGE UP (ref 3.8–5.2)
RBC # FLD: 12.2 % — SIGNIFICANT CHANGE UP (ref 10.3–16.9)
SODIUM SERPL-SCNC: 143 MMOL/L — SIGNIFICANT CHANGE UP (ref 135–145)
VANCOMYCIN TROUGH SERPL-MCNC: 16 UG/ML — SIGNIFICANT CHANGE UP (ref 10–20)
WBC # BLD: 4.1 K/UL — SIGNIFICANT CHANGE UP (ref 3.8–10.5)
WBC # FLD AUTO: 4.1 K/UL — SIGNIFICANT CHANGE UP (ref 3.8–10.5)

## 2018-04-22 RX ADMIN — Medication 650 MILLIGRAM(S): at 21:35

## 2018-04-22 RX ADMIN — Medication 650 MILLIGRAM(S): at 11:37

## 2018-04-22 RX ADMIN — Medication 650 MILLIGRAM(S): at 06:00

## 2018-04-22 RX ADMIN — PIPERACILLIN AND TAZOBACTAM 200 GRAM(S): 4; .5 INJECTION, POWDER, LYOPHILIZED, FOR SOLUTION INTRAVENOUS at 06:04

## 2018-04-22 RX ADMIN — Medication 650 MILLIGRAM(S): at 22:30

## 2018-04-22 RX ADMIN — Medication 1 APPLICATION(S): at 11:35

## 2018-04-22 RX ADMIN — Medication 650 MILLIGRAM(S): at 12:32

## 2018-04-22 RX ADMIN — VALSARTAN 40 MILLIGRAM(S): 80 TABLET ORAL at 06:04

## 2018-04-22 RX ADMIN — Medication 81 MILLIGRAM(S): at 11:34

## 2018-04-22 RX ADMIN — Medication 100 MILLIGRAM(S): at 11:34

## 2018-04-22 RX ADMIN — Medication 166.67 MILLIGRAM(S): at 02:11

## 2018-04-22 RX ADMIN — Medication 166.67 MILLIGRAM(S): at 15:55

## 2018-04-22 RX ADMIN — PIPERACILLIN AND TAZOBACTAM 200 GRAM(S): 4; .5 INJECTION, POWDER, LYOPHILIZED, FOR SOLUTION INTRAVENOUS at 13:02

## 2018-04-22 RX ADMIN — WATER 1 APPLICATION(S): 99.05 SOLUTION OPHTHALMIC at 18:04

## 2018-04-22 RX ADMIN — PIPERACILLIN AND TAZOBACTAM 200 GRAM(S): 4; .5 INJECTION, POWDER, LYOPHILIZED, FOR SOLUTION INTRAVENOUS at 01:26

## 2018-04-22 RX ADMIN — Medication 650 MILLIGRAM(S): at 05:00

## 2018-04-22 NOTE — PROGRESS NOTE ADULT - PROBLEM SELECTOR PLAN 1
Admitted for post-surgical complication s/p L foot surgery  - F/u podiatry recs  - c/w vanc/zosyn per podiatry for one more day, likely d/c sunday on PO abx   - vanc trough at 10 PM   - tylenol PRN for pain control Admitted for post-surgical complication s/p L foot surgery  - F/u podiatry recs  - Continuing vanc/zosyn today  - vanc trough at noon today   - tylenol PRN for pain control

## 2018-04-22 NOTE — PROGRESS NOTE ADULT - PROBLEM SELECTOR PLAN 3
IMPROVE 0; low vte risk, no chemoprophylaxis F: none  E: Replete electrolytes PRN K > 4, Mg > 2   N: DASH

## 2018-04-23 ENCOUNTER — TRANSCRIPTION ENCOUNTER (OUTPATIENT)
Age: 63
End: 2018-04-23

## 2018-04-23 VITALS
OXYGEN SATURATION: 95 % | DIASTOLIC BLOOD PRESSURE: 67 MMHG | SYSTOLIC BLOOD PRESSURE: 127 MMHG | TEMPERATURE: 98 F | RESPIRATION RATE: 18 BRPM | HEART RATE: 78 BPM

## 2018-04-23 RX ORDER — LINEZOLID 600 MG/300ML
600 INJECTION, SOLUTION INTRAVENOUS ONCE
Qty: 0 | Refills: 0 | Status: COMPLETED | OUTPATIENT
Start: 2018-04-23 | End: 2018-04-23

## 2018-04-23 RX ORDER — LINEZOLID 600 MG/300ML
1 INJECTION, SOLUTION INTRAVENOUS
Qty: 56 | Refills: 0 | OUTPATIENT
Start: 2018-04-23 | End: 2018-05-20

## 2018-04-23 RX ADMIN — VALSARTAN 40 MILLIGRAM(S): 80 TABLET ORAL at 06:10

## 2018-04-23 RX ADMIN — LINEZOLID 300 MILLIGRAM(S): 600 INJECTION, SOLUTION INTRAVENOUS at 12:27

## 2018-04-23 RX ADMIN — Medication 166.67 MILLIGRAM(S): at 02:09

## 2018-04-23 RX ADMIN — Medication 650 MILLIGRAM(S): at 12:48

## 2018-04-23 NOTE — DISCHARGE NOTE ADULT - CARE PLAN
Principal Discharge DX:	Toe infection  Goal:	Improvement in your infection  Assessment and plan of treatment:	You came to the hospital with toe pain following your surgical procedure. You were found to have an infection in your foot which will need to be treated with antibiotics for a prolonged period of time. You will need to take Linezolid and rifampin as prescribed for 24 more days. Please follow up with Dr. Leon in 1 week from discharge. Call his office to schedule an appointment and tell them that you were hospitalized and under his care.  Secondary Diagnosis:	Essential hypertension  Assessment and plan of treatment:	Please continue to take your home medications as ordered and follow up with your primary care doctor for further management of this condition.

## 2018-04-23 NOTE — DISCHARGE NOTE ADULT - MEDICATION SUMMARY - MEDICATIONS TO TAKE
I will START or STAY ON the medications listed below when I get home from the hospital:    oxyCODONE-acetaminophen 5 mg-325 mg oral tablet  -- 1-2 tab(s) by mouth every 4-6 hours, As Needed MDD:6 for pain  -- Caution federal law prohibits the transfer of this drug to any person other  than the person for whom it was prescribed.  May cause drowsiness.  Alcohol may intensify this effect.  Use care when operating dangerous machinery.  This prescription cannot be refilled.  This product contains acetaminophen.  Do not use  with any other product containing acetaminophen to prevent possible liver damage.  Using more of this medication than prescribed may cause serious breathing problems.    -- Indication: For Pain    Diovan 40 mg oral tablet  -- 1 tab(s) by mouth once a day  -- Indication: For Essential hypertension    rifAMPin 300 mg oral capsule  -- 1 cap(s) by mouth 2 times a day   -- It is very important that you take or use this exactly as directed.  Do not skip doses or discontinue unless directed by your doctor.  May discolor urine or feces.  Take medication on an empty stomach 1 hour before or 2 to 3 hours after a meal unless otherwise directed by your doctor.    -- Indication: For POST SURGICAL COMP;TOE INFECTION    Zyvox 600 mg oral tablet  -- 1 tab(s) by mouth 2 times a day   -- Avoid chocolate, wine and cheese while taking this medication.  Finish all this medication unless otherwise directed by prescriber.  Obtain medical advice before taking any non-prescription drugs as some may affect the action of this medication.    -- Indication: For POST SURGICAL COMP;TOE INFECTION

## 2018-04-23 NOTE — PROGRESS NOTE ADULT - SUBJECTIVE AND OBJECTIVE BOX
INTERVAL HPI/OVERNIGHT EVENTS: RAND o/n. ROS negative this AM. Reports improvement in foot pain/erythema w/ IV abx.     VITAL SIGNS:  T(F): 98.4 (04-20-18 @ 09:51)  HR: 75 (04-20-18 @ 09:51)  BP: 127/79 (04-20-18 @ 09:51)  RR: 18 (04-20-18 @ 09:51)  SpO2: 96% (04-20-18 @ 09:51)  Wt(kg): --    PHYSICAL EXAM:    Constitutional: RAND. resting comfortably   ENMT: MMM  Respiratory: CTAB  Cardiovascular: nl s1/s2. rrr. no m/r/g  Gastrointestinal: NT/ND. BS+  Extremities: Left great toe with erythema and edema, tender.   Vascular: 2+ radial b/l   Neurological: A&ox3. no focal deficits.       MEDICATIONS  (STANDING):  docusate sodium 100 milliGRAM(s) Oral daily  piperacillin/tazobactam IVPB. 3.375 Gram(s) IV Intermittent every 6 hours  potassium chloride    Tablet ER 40 milliEquivalent(s) Oral every 4 hours  valsartan 40 milliGRAM(s) Oral daily  vancomycin  IVPB 1250 milliGRAM(s) IV Intermittent every 12 hours    MEDICATIONS  (PRN):  acetaminophen   Tablet. 650 milliGRAM(s) Oral every 6 hours PRN Moderate Pain (4 - 6)      Allergies    No Known Allergies    Intolerances        LABS:                        13.4   3.8   )-----------( 165      ( 20 Apr 2018 07:08 )             40.1     04-20    142  |  104  |  13  ----------------------------<  77  3.4<L>   |  29  |  0.65    Ca    8.8      20 Apr 2018 07:08  Mg     1.9     04-20    TPro  6.8  /  Alb  4.3  /  TBili  0.3  /  DBili  x   /  AST  32  /  ALT  16  /  AlkPhos  77  04-19    PT/INR - ( 19 Apr 2018 13:35 )   PT: 10.8 sec;   INR: 0.97          PTT - ( 19 Apr 2018 13:35 )  PTT:34.3 sec      RADIOLOGY & ADDITIONAL TESTS:
63yo F w/ PMHx of HTN presented for L toe pain. Pt recently underwent L foot surgery on 4/10 which involved a hallux IPJ fusion and hammertoe correction, but had persistent pain despite being discharged on percocet. Pt describes the pain as throbbing, stabbing and non-radiating. Pt denies nausea, vomiting, fever, chills, diarrhea, night sweats.     In ED, T 97.9F, HR 75, /84, RR 18, 98% on RA. Podiatry consulted who diagnosed pt with post-surgical infection of l hallux s/p IPJ fusion and hammertoe correction. Gave vanc and zosyn.         ROS negative this AM. Reports improvement in foot pain/erythema w/ IV abx.     ICU Vital Signs Last 24 Hrs  T(C): 36.7 (20 Apr 2018 16:05), Max: 36.9 (20 Apr 2018 09:51)  T(F): 98.1 (20 Apr 2018 16:05), Max: 98.4 (20 Apr 2018 09:51)  HR: 77 (20 Apr 2018 16:05) (67 - 85)  BP: 106/67 (20 Apr 2018 16:05) (106/67 - 148/92)  BP(mean): --  ABP: --  ABP(mean): --  RR: 18 (20 Apr 2018 16:05) (16 - 18)  SpO2: 97% (20 Apr 2018 16:05) (94% - 97%)            PHYSICAL EXAM:    Constitutional: RAND. resting comfortably   ENMT: MMM  Respiratory: CTAB  Cardiovascular: nl s1/s2. rrr. no m/r/g  Gastrointestinal: NT/ND. BS+  Extremities: Left great toe with erythema and edema, tender.   Vascular: 2+ radial b/l   Neurological: A&ox3.       MEDICATIONS  (STANDING):  docusate sodium 100 milliGRAM(s) Oral daily  piperacillin/tazobactam IVPB. 3.375 Gram(s) IV Intermittent every 6 hours  potassium chloride    Tablet ER 40 milliEquivalent(s) Oral every 4 hours  valsartan 40 milliGRAM(s) Oral daily  vancomycin  IVPB 1250 milliGRAM(s) IV Intermittent every 12 hours    MEDICATIONS  (PRN):  acetaminophen   Tablet. 650 milliGRAM(s) Oral every 6 hours PRN Moderate Pain (4 - 6)      Allergies    No Known Allergies    Intolerances        LABS:                        13.4   3.8   )-----------( 165      ( 20 Apr 2018 07:08 )             40.1     04-20    142  |  104  |  13  ----------------------------<  77  3.4<L>   |  29  |  0.65    Ca    8.8      20 Apr 2018 07:08  Mg     1.9     04-20    TPro  6.8  /  Alb  4.3  /  TBili  0.3  /  DBili  x   /  AST  32  /  ALT  16  /  AlkPhos  77  04-19    PT/INR - ( 19 Apr 2018 13:35 )   PT: 10.8 sec;   INR: 0.97          PTT - ( 19 Apr 2018 13:35 )  PTT:34.3 sec
63yo F w/ PMHx of HTN presented for L toe pain. Pt recently underwent L foot surgery on 4/10 which involved a hallux IPJ fusion and hammertoe correction, but had persistent pain despite being discharged on percocet. Pt describes the pain as throbbing, stabbing and non-radiating. Pt denies nausea, vomiting, fever, chills, diarrhea, night sweats.     In ED, T 97.9F, HR 75, /84, RR 18, 98% on RA. Podiatry consulted who diagnosed pt with post-surgical infection of l hallux s/p IPJ fusion and hammertoe correction. Gave vanc and zosyn.         ROS negative this AM. Reports improvement in foot pain/erythema w/ IV abx.     MEDICATIONS:  valsartan 40 milliGRAM(s) Oral daily    piperacillin/tazobactam IVPB. 3.375 Gram(s) IV Intermittent every 6 hours  rifampin 300 milliGRAM(s) Oral two times a day  vancomycin  IVPB 1250 milliGRAM(s) IV Intermittent every 12 hours      acetaminophen   Tablet. 650 milliGRAM(s) Oral every 6 hours PRN    docusate sodium 100 milliGRAM(s) Oral daily      aspirin enteric coated 81 milliGRAM(s) Oral daily  BACItracin   Ointment 1 Application(s) Topical daily  ophthalmic irrigation, extraocular 1 Application(s) Right EYE two times a day      Complaint:     Otherwise 12 point review of systems is normal.    PHYSICAL EXAM:    Constitutional: RAND. resting comfortably   ENMT: MMM  Respiratory: CTAB  Cardiovascular: nl s1/s2. rrr. no m/r/g  Gastrointestinal: NT/ND. BS+  Extremities: Left great toe with erythema and edema, tender.   Vascular: 2+ radial b/l   Neurological: A&ox3.         ICU Vital Signs Last 24 Hrs  T(C): 36.9 (21 Apr 2018 20:53), Max: 36.9 (21 Apr 2018 20:53)  T(F): 98.4 (21 Apr 2018 20:53), Max: 98.4 (21 Apr 2018 20:53)  HR: 73 (21 Apr 2018 20:53) (71 - 80)  BP: 112/70 (21 Apr 2018 20:53) (112/70 - 119/86)  BP(mean): --  ABP: --  ABP(mean): --  RR: 15 (21 Apr 2018 20:53) (15 - 20)  SpO2: 95% (21 Apr 2018 20:53) (95% - 97%)        ECG:      CHEST X RAY    CT    MRI    MRA    CT ANGIO    CAROTID DUPLEX    DUPLEX     Echocardiogram    Catheterization:    Stress Test:     LABS:	 	  CARDIAC MARKERS:                              13.9   3.7   )-----------( 159      ( 21 Apr 2018 09:10 )             40.2     04-21    145  |  108  |  11  ----------------------------<  89  3.9   |  26  |  0.66    Ca    9.2      21 Apr 2018 09:10  Mg     1.9     04-21      ASSESSMENT/PLAN: 	    63yo F w/ PMHx of HTN presented for L toe pain, being admitted for post-surgical infection s/p hallux IPJ fusion and hammertoe correction.     Problem/Plan - 1:  ·  Problem: Post surgical complication.  Plan: Pt being admitted for post-surgical complication s/p L foot surgery  - podiatry following  - afebrile  - labs wnl  - c/w vanc/zosyn per podiatry for one more day, likely d/c sunday on PO abx   - vanc trough at 10 PM   - tylenol PRN for pain control.    Problem/Plan - 2:  ·  Problem: Essential hypertension.  Plan: - c/w home diovan 40mg qday.     Problem/Plan - 3:  ·  Problem: Prophylactic measure.  Plan: IMPROVE 0; low vte risk, no chemoprophylaxis.     Problem/Plan - 4:  ·  Problem: Nutrition, metabolism, and development symptoms.  Plan: Dash/TLC diet.     Attending Attestation:   I was physically present for the key portions of the evaluation and management (E/M) service provided.  I agree with the above history, physical, and plan which I have reviewed and edited where appropriate.     60 minutes spent on total encounter; more than 50% of the visit was spent counseling and/or coordinating care by the attending physician.
INTERVAL HPI/OVERNIGHT EVENTS:    Subjectively toe better  Per Dr Leon, toe much improved    MEDICATIONS  (STANDING):  aspirin enteric coated 81 milliGRAM(s) Oral daily  BACItracin   Ointment 1 Application(s) Topical daily  docusate sodium 100 milliGRAM(s) Oral daily  ophthalmic irrigation, extraocular 1 Application(s) Right EYE two times a day  rifampin 300 milliGRAM(s) Oral two times a day  valsartan 40 milliGRAM(s) Oral daily  vancomycin  IVPB 1250 milliGRAM(s) IV Intermittent every 12 hours    MEDICATIONS  (PRN):  acetaminophen   Tablet. 650 milliGRAM(s) Oral every 6 hours PRN Moderate Pain (4 - 6)      Allergies    No Known Allergies    EXAM  Vital Signs Last 24 Hrs  T(C): 36.3 (22 Apr 2018 20:32), Max: 36.9 (22 Apr 2018 09:17)  T(F): 97.4 (22 Apr 2018 20:32), Max: 98.4 (22 Apr 2018 09:17)  HR: 76 (22 Apr 2018 20:32) (72 - 86)  BP: 132/90 (22 Apr 2018 20:32) (107/72 - 132/90)  BP(mean): --  RR: 15 (22 Apr 2018 20:32) (15 - 18)  SpO2: 94% (22 Apr 2018 20:32) (94% - 96%)  On RA  No distress  No rash  RRR  Left great toe with improvement of erythema      LABS:                        14.1   4.1   )-----------( 157      ( 22 Apr 2018 07:17 )             40.9     04-22    143  |  104  |  15  ----------------------------<  95  4.6   |  30  |  0.88    Ca    9.7      22 Apr 2018 07:17  Mg     1.9     04-22      MICROBIOLOGY:    Culture - Blood (04.19.18 @ 17:36)    Specimen Source: .Blood Blood    Culture Results:   No growth at 3 days.    Culture - Blood (04.19.18 @ 17:36)    Specimen Source: .Blood Blood    Culture Results:   No growth at 3 days.
Patient is a 62y old  Female who presents with a chief complaint of Toe pain (19 Apr 2018 16:09)      INTERVAL HPI/ OVERNIGHT EVENTS  PT reports no AE overnight. would like to go home, denies foot pain. tolerating Abx       LABS                        14.1   4.1   )-----------( 157      ( 22 Apr 2018 07:17 )             40.9     04-22    143  |  104  |  15  ----------------------------<  95  4.6   |  30  |  0.88    Ca    9.7      22 Apr 2018 07:17  Mg     1.9     04-22          ICU Vital Signs Last 24 Hrs  T(C): 36.9 (22 Apr 2018 09:17), Max: 36.9 (21 Apr 2018 20:53)  T(F): 98.4 (22 Apr 2018 09:17), Max: 98.4 (21 Apr 2018 20:53)  HR: 86 (22 Apr 2018 09:17) (72 - 86)  BP: 126/81 (22 Apr 2018 09:17) (107/72 - 126/81)  BP(mean): --  ABP: --  ABP(mean): --  RR: 17 (22 Apr 2018 09:17) (15 - 18)  SpO2: 96% (22 Apr 2018 09:17) (95% - 96%)      RADIOLOGY    MICROBIOLOGY    PHYSICAL EXAM  Lower Extremity Focused  Left foot edema and erythema improved,  now receeded to margin of lateral nail fold. No drainage, no wound dehiscence, sutures intact skin well coapted. CFT brisk,
Patient is a 62y old  Female who presents with a chief complaint of Toe pain (19 Apr 2018 16:09)      INTERVAL HPI/ OVERNIGHT EVENTS  pt reports no ae overnight. denies any foot or toe pain. tolerating Abx. denies nausea vomiting fever chills shortness of breath     LABS                        13.9   3.7   )-----------( 159      ( 21 Apr 2018 09:10 )             40.2     04-21    145  |  108  |  11  ----------------------------<  89  3.9   |  26  |  0.66    Ca    9.2      21 Apr 2018 09:10  Mg     1.9     04-21    TPro  6.8  /  Alb  4.3  /  TBili  0.3  /  DBili  x   /  AST  32  /  ALT  16  /  AlkPhos  77  04-19    PT/INR - ( 19 Apr 2018 13:35 )   PT: 10.8 sec;   INR: 0.97          PTT - ( 19 Apr 2018 13:35 )  PTT:34.3 sec    ICU Vital Signs Last 24 Hrs  T(C): 36.7 (21 Apr 2018 08:58), Max: 36.7 (20 Apr 2018 16:05)  T(F): 98 (21 Apr 2018 08:58), Max: 98.1 (20 Apr 2018 16:05)  HR: 80 (21 Apr 2018 08:58) (71 - 80)  BP: 119/73 (21 Apr 2018 08:58) (101/61 - 119/86)  BP(mean): --  ABP: --  ABP(mean): --  RR: 18 (21 Apr 2018 08:58) (18 - 20)  SpO2: 95% (21 Apr 2018 08:58) (95% - 97%)      RADIOLOGY      MICROBIOLOGY    PHYSICAL EXAM  NAD, AAO x 3, mying in bed comfortably   Lower Extremity Focused  Left foot dp/pt  2/4. cft brisk to all toes.   1st toe + edema, erythema receding from marked line.  no dehiscence, no fluctuance, no drainage, ttp over lateral nail fold of great toe, no drainage. 2nd 3rd and 4th toe incisions clean, no erythema or drainage, k-wire sites clean no signs of infection
Patient is a 62y old  Female who presents with a chief complaint of Toe pain (23 Apr 2018 10:33)      INTERVAL HPI/ OVERNIGHT EVENTS  PT reports no AE overnight. no complaints at this time.       LABS                        14.1   4.1   )-----------( 157      ( 22 Apr 2018 07:17 )             40.9     04-22    143  |  104  |  15  ----------------------------<  95  4.6   |  30  |  0.88    Ca    9.7      22 Apr 2018 07:17  Mg     1.9     04-22          ICU Vital Signs Last 24 Hrs  T(C): 36.9 (23 Apr 2018 08:48), Max: 36.9 (23 Apr 2018 08:48)  T(F): 98.5 (23 Apr 2018 08:48), Max: 98.5 (23 Apr 2018 08:48)  HR: 78 (23 Apr 2018 08:48) (76 - 78)  BP: 127/67 (23 Apr 2018 08:48) (113/72 - 132/90)  BP(mean): --  ABP: --  ABP(mean): --  RR: 18 (23 Apr 2018 08:48) (15 - 18)  SpO2: 95% (23 Apr 2018 08:48) (94% - 98%)      RADIOLOGY    MICROBIOLOGY    PHYSICAL EXAM  Lower Extremity Focused  left foot, interval improvement in edema and erythema over hallux, no fluctuance or drainage,  to palpation over lateral nail fold.
Patient seen at bedside with attending present. She is s/p Left hallux IPJ fusion with arthroplasty of PIPJ of digits 2-5. She notes pain on her left foot. She denies nausea, vomiting, fevers, or chills.     ICU Vital Signs Last 24 Hrs  T(C): 36.4 (20 Apr 2018 05:40), Max: 36.7 (19 Apr 2018 18:17)  T(F): 97.6 (20 Apr 2018 05:40), Max: 98 (19 Apr 2018 18:17)  HR: 67 (20 Apr 2018 05:40) (67 - 85)  BP: 115/73 (20 Apr 2018 05:40) (115/73 - 148/92)  BP(mean): --  ABP: --  ABP(mean): --  RR: 18 (20 Apr 2018 05:40) (16 - 18)  SpO2: 94% (20 Apr 2018 05:40) (94% - 98%)                          13.4   3.8   )-----------( 165      ( 20 Apr 2018 07:08 )             40.1     04-20    142  |  104  |  13  ----------------------------<  77  3.4<L>   |  29  |  0.65    Ca    8.8      20 Apr 2018 07:08  Mg     1.9     04-20    TPro  6.8  /  Alb  4.3  /  TBili  0.3  /  DBili  x   /  AST  32  /  ALT  16  /  AlkPhos  77  04-19    ESR: 12  CRP: 0.22  Blood cx negative to date    PHYSICAL EXAM  Lower Extremity Focused: Left hallux with erythema and edema resolving around surgical incision site extending from the level of the 1st MPJ distally. Incision site is well coapted with sutures in place. Left hallux lateral nail fold with dried drainage. No active drainage identified. No malodor, no open wounds. Incision sites located on dorsal aspect of left toes 2-4 that are well coapted with sutures intact and no erythema or edema. Pins in place in toes 2-4, no erythema or edema around pin sites.    Xray Foot AP + Lateral + Oblique, Left (04.19.18 @ 14:15) >      EXAM:  XR FOOT-LEFT MIN 3 VIEWS                          PROCEDURE DATE:  04/19/2018            INTERPRETATION:  Indication: post op inf    3 views of the left foot are submitted. No prior studies available for   comparison. There are postoperative changes in the first through fourth   digits. Soft tissue swelling is present. This is most pronounced in the   hallux. There is diffusely decreased mineralization and a pes planus   deformity.      Impression: Postoperative changes with soft tissue swelling. Infection   cannot be excluded      "Thank you for the opportunity to participate in the care of this   patient."        ART BAILEY M.D., ATTENDING RADIOLOGIST  This document has been electronically signed. Apr 20 2018  8:23AM    < end of copied text >      A/P:  62yF 9 days s/p left hallux IPJ fusion and hammertoe correction of toes 2-4 presents with infection of the left hallux.    -Continue broad spectrum IV abx. Pt is improving on the IV abx  -Pain control per primary team  -Podiatry will follow
Pt seen and examined No complaints  tolerating antibiotics  denies foot pain    REVIEW OF SYSTEMS:  Constitutional: No fever, weight loss or fatigue  Cardiovascular: No chest pain, palpitations, dizziness or leg swelling  Gastrointestinal: No abdominal or epigastric pain. No nausea, vomiting or hematemesis; No diarrhea or constipation. No melena or hematochezia.  Skin: No itching, burning, rashes or lesions       MEDICATIONS:  MEDICATIONS  (STANDING):  aspirin enteric coated 81 milliGRAM(s) Oral daily  BACItracin   Ointment 1 Application(s) Topical daily  docusate sodium 100 milliGRAM(s) Oral daily  ophthalmic irrigation, extraocular 1 Application(s) Right EYE two times a day  piperacillin/tazobactam IVPB. 3.375 Gram(s) IV Intermittent every 6 hours  rifampin 300 milliGRAM(s) Oral two times a day  valsartan 40 milliGRAM(s) Oral daily  vancomycin  IVPB 1250 milliGRAM(s) IV Intermittent every 12 hours    MEDICATIONS  (PRN):  acetaminophen   Tablet. 650 milliGRAM(s) Oral every 6 hours PRN Moderate Pain (4 - 6)      Allergies    No Known Allergies    Intolerances        Vital Signs Last 24 Hrs  T(C): 36.9 (22 Apr 2018 09:17), Max: 36.9 (21 Apr 2018 20:53)  T(F): 98.4 (22 Apr 2018 09:17), Max: 98.4 (21 Apr 2018 20:53)  HR: 86 (22 Apr 2018 09:17) (72 - 86)  BP: 126/81 (22 Apr 2018 09:17) (107/72 - 126/81)  BP(mean): --  RR: 17 (22 Apr 2018 09:17) (15 - 18)  SpO2: 96% (22 Apr 2018 09:17) (95% - 96%)      PHYSICAL EXAM:    General: Well developed; well nourished; in no acute distress  HEENT: MMM, conjunctiva and sclera clear  Lungs: clear  Heart: regular  Gastrointestinal: Soft non-tender non-distended; Normal bowel sounds; No hepatosplenomegaly  Skin: Warm and dry. No obvious rash  Ext: left foot with dressings    LABS:      CBC Full  -  ( 22 Apr 2018 07:17 )  WBC Count : 4.1 K/uL  Hemoglobin : 14.1 g/dL  Hematocrit : 40.9 %  Platelet Count - Automated : 157 K/uL  Mean Cell Volume : 94.9 fL  Mean Cell Hemoglobin : 32.7 pg  Mean Cell Hemoglobin Concentration : 34.5 g/dL  Auto Neutrophil # : x  Auto Lymphocyte # : x  Auto Monocyte # : x  Auto Eosinophil # : x  Auto Basophil # : x  Auto Neutrophil % : x  Auto Lymphocyte % : x  Auto Monocyte % : x  Auto Eosinophil % : x  Auto Basophil % : x    04-22    143  |  104  |  15  ----------------------------<  95  4.6   |  30  |  0.88    Ca    9.7      22 Apr 2018 07:17  Mg     1.9     04-22          Culture - Blood (04.19.18 @ 17:36)    Specimen Source: .Blood Blood    Culture Results:   No growth at 2 days.                  RADIOLOGY & ADDITIONAL STUDIES (The following images were personally reviewed):
SUBJECTIVE: Patient seen and examined at bedside. No overnight events. Complains of mild headache and difficulty sleeping in hospital, but otherwise no complaints.    ROS:  CV: Denies chest pain  Resp: Denies SOB  GI: Denies abdominal pain, diarrhea, nausea, vomiting  ID: Denies fevers, chills    OBJECTIVE:    VITALS  Vital Signs Last 24 Hrs  T(C): 36.9 (22 Apr 2018 09:17), Max: 36.9 (21 Apr 2018 20:53)  T(F): 98.4 (22 Apr 2018 09:17), Max: 98.4 (21 Apr 2018 20:53)  HR: 86 (22 Apr 2018 09:17) (72 - 86)  BP: 126/81 (22 Apr 2018 09:17) (107/72 - 126/81)  BP(mean): --  RR: 17 (22 Apr 2018 09:17) (15 - 18)  SpO2: 96% (22 Apr 2018 09:17) (95% - 96%)    PHYSICAL EXAM  General: middle aged woman sitting up in bed, A&Ox 3; NAD  Eyes: EOM grossly intact; no scleral icterus  ENMT: MMM  Respiratory: CTAB; no W/R/R auscultated; good air movement  Cardiovascular: RRR; S1/S2  Gastrointestinal: Soft; NTND without guarding; bowel sounds present  Extremities: L foot in podiatric boot; WWP; no edema; radial/pedal pulses palpable  Neurological:  CNII-XII grossly intact    MEDICATIONS  (STANDING):  aspirin enteric coated 81 milliGRAM(s) Oral daily  BACItracin   Ointment 1 Application(s) Topical daily  docusate sodium 100 milliGRAM(s) Oral daily  ophthalmic irrigation, extraocular 1 Application(s) Right EYE two times a day  piperacillin/tazobactam IVPB. 3.375 Gram(s) IV Intermittent every 6 hours  rifampin 300 milliGRAM(s) Oral two times a day  valsartan 40 milliGRAM(s) Oral daily  vancomycin  IVPB 1250 milliGRAM(s) IV Intermittent every 12 hours    MEDICATIONS  (PRN):  acetaminophen   Tablet. 650 milliGRAM(s) Oral every 6 hours PRN Moderate Pain (4 - 6)    LABS                        14.1   4.1   )-----------( 157      ( 22 Apr 2018 07:17 )             40.9     04-22    143  |  104  |  15  ----------------------------<  95  4.6   |  30  |  0.88    Ca    9.7      22 Apr 2018 07:17  Mg     1.9     04-22      ALLERGIES:  No Known Allergies    RADIOLOGY and Additional Tests reviewed.

## 2018-04-23 NOTE — DISCHARGE NOTE ADULT - PATIENT PORTAL LINK FT
You can access the [facility] Patient Portal, offered by [facility], by registering with the following website: [email or web address]

## 2018-04-23 NOTE — DISCHARGE NOTE ADULT - ADDITIONAL INSTRUCTIONS
Please call the office of Dr. Leon once you are discharged to schedule an appointment in the next week from discharge to monitor your toe infection.

## 2018-04-23 NOTE — PROGRESS NOTE ADULT - PROVIDER SPECIALTY LIST ADULT
Infectious Disease
Internal Medicine
Internal Medicine
Intervent Cardiology
Podiatry
Internal Medicine
Internal Medicine

## 2018-04-23 NOTE — DISCHARGE NOTE ADULT - CARE PROVIDER_API CALL
Jose R Leon (VISHAL), Podiatric Medicine and Surgery  [address] [place] [address]  Phone: [phone]  Fax: [phone]

## 2018-04-23 NOTE — DISCHARGE NOTE ADULT - HOSPITAL COURSE
63yo F w/ PMHx of HTN presented for L toe pain. Pt recently underwent L foot surgery on 4/10 which involved a hallux IPJ fusion and hammertoe correction, but had persistent pain despite being discharged on percocet. Patient on admission was diagnosed with post surgical infection of left hallux. Patient seen and evaluated by podiatry, no surgical interventions at this time. Seen by ID who recommended treatment for 4 weeks with linezolid and rifampin given no culture data and recent surgical procedure with hardware. ESR and CRP wnl. Patient educated about treatment plan and agreed with plan. Patient to schedule follow up appointment with podiatry within one week of discharge.

## 2018-04-23 NOTE — DISCHARGE NOTE ADULT - PLAN OF CARE
Improvement in your infection You came to the hospital with toe pain following your surgical procedure. You were found to have an infection in your foot which will need to be treated with antibiotics for a prolonged period of time. You will need to take Linezolid and rifampin as prescribed for 24 more days. Please follow up with Dr. Leon in 1 week from discharge. Call his office to schedule an appointment and tell them that you were hospitalized and under his care. Please continue to take your home medications as ordered and follow up with your primary care doctor for further management of this condition.

## 2018-04-23 NOTE — PROGRESS NOTE ADULT - ASSESSMENT
61yo F w/ PMHx of HTN presented for L toe pain, being admitted for post-surgical infection s/p hallux IPJ fusion and hammertoe correction.
61yo F w/ PMHx of HTN presented for L toe pain, being admitted for post-surgical infection s/p hallux IPJ fusion and hammertoe correction.     Problem/Plan - 1:  ·  Problem: Post surgical complication.  Plan: Pt being admitted for post-surgical complication s/p L foot surgery  - podiatry following  - afebrile  - labs wnl  - c/w vanc/zosyn per podiatry for one more day, likely d/c sunday on PO abx   - vanc trough at 10 PM   - tylenol PRN for pain control.    Problem/Plan - 2:  ·  Problem: Essential hypertension.  Plan: - c/w home diovan 40mg qday.     Problem/Plan - 3:  ·  Problem: Prophylactic measure.  Plan: IMPROVE 0; low vte risk, no chemoprophylaxis.     Problem/Plan - 4:  ·  Problem: Nutrition, metabolism, and development symptoms.  Plan: Dash/TLC diet.
62yF 9 days s/p left hallux IPJ fusion and hammertoe correction of toes 2-4 presents with infection of the left hallux.       - OP Abx per Id recs   - dry sterile dressing   - ambulate with surgical shoe as tolerated  - follow up with Dr. Leon 3-5 days after discharge,
62yF 9 days s/p left hallux IPJ fusion and hammertoe correction of toes 2-4 presents with infection of the left hallux.    -improving on IV Abx   - f/leonardo ID recs   -Pain control per primary team  -Podiatry will follow  - dry sterile dressing   - ambulate with surgical shoe as tolerated  - follow up with Dr. Leon 3-5 days after discharge,
62yF 9 days s/p left hallux IPJ fusion and hammertoe correction of toes 2-4 presents with infection of the left hallux.    -improving on IV Abx   - f/u ID recs appreciated.   -Pain control per primary team  -Podiatry will follow
Left great toe infection following podiatric surgery with stabilizing hardware- soft tissue but bone/hardware involvement cannot be excluded  Improving on broad spectrum abx    RECOMMEND  Streamline abx to GPC coverage  Continue IV Vanco and Rifampin for now  If IV abx avoided, would treat with PO Linezolid plus Rifampin, then doxycycline plus Rifampin, if ADEs due to Linezolid  Please try to obtain authorization for outpatient Linezilid (try for 4 weeks)  Check ESR and CRP
await cultures,   continue antibiotics.  wound care per podiatry
61yo F w/ PMHx of HTN presented for L toe pain, being admitted for post-surgical infection s/p hallux IPJ fusion and hammertoe correction.

## 2018-04-24 LAB
CULTURE RESULTS: SIGNIFICANT CHANGE UP
CULTURE RESULTS: SIGNIFICANT CHANGE UP
SPECIMEN SOURCE: SIGNIFICANT CHANGE UP
SPECIMEN SOURCE: SIGNIFICANT CHANGE UP

## 2018-04-26 DIAGNOSIS — T81.4XXA INFECTION FOLLOWING A PROCEDURE, INITIAL ENCOUNTER: ICD-10-CM

## 2018-04-26 DIAGNOSIS — I10 ESSENTIAL (PRIMARY) HYPERTENSION: ICD-10-CM

## 2018-04-26 DIAGNOSIS — Z87.891 PERSONAL HISTORY OF NICOTINE DEPENDENCE: ICD-10-CM
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
## 2018-04-26 DIAGNOSIS — L08.9 LOCAL INFECTION OF THE SKIN AND SUBCUTANEOUS TISSUE, UNSPECIFIED: ICD-10-CM
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
## 2018-05-23 PROCEDURE — 80048 BASIC METABOLIC PNL TOTAL CA: CPT

## 2018-05-23 PROCEDURE — 86850 RBC ANTIBODY SCREEN: CPT

## 2018-05-23 PROCEDURE — 96374 THER/PROPH/DIAG INJ IV PUSH: CPT

## 2018-05-23 PROCEDURE — 99285 EMERGENCY DEPT VISIT HI MDM: CPT | Mod: 25

## 2018-05-23 PROCEDURE — 85730 THROMBOPLASTIN TIME PARTIAL: CPT

## 2018-05-23 PROCEDURE — 36415 COLL VENOUS BLD VENIPUNCTURE: CPT

## 2018-05-23 PROCEDURE — 85652 RBC SED RATE AUTOMATED: CPT

## 2018-05-23 PROCEDURE — 86140 C-REACTIVE PROTEIN: CPT

## 2018-05-23 PROCEDURE — 80053 COMPREHEN METABOLIC PANEL: CPT

## 2018-05-23 PROCEDURE — 87040 BLOOD CULTURE FOR BACTERIA: CPT

## 2018-05-23 PROCEDURE — 86900 BLOOD TYPING SEROLOGIC ABO: CPT

## 2018-05-23 PROCEDURE — 80202 ASSAY OF VANCOMYCIN: CPT

## 2018-05-23 PROCEDURE — 86803 HEPATITIS C AB TEST: CPT

## 2018-05-23 PROCEDURE — 71046 X-RAY EXAM CHEST 2 VIEWS: CPT

## 2018-05-23 PROCEDURE — 73630 X-RAY EXAM OF FOOT: CPT

## 2018-05-23 PROCEDURE — 85610 PROTHROMBIN TIME: CPT

## 2018-05-23 PROCEDURE — 85027 COMPLETE CBC AUTOMATED: CPT

## 2018-05-23 PROCEDURE — 85025 COMPLETE CBC W/AUTO DIFF WBC: CPT

## 2018-05-23 PROCEDURE — 96375 TX/PRO/DX INJ NEW DRUG ADDON: CPT

## 2018-05-23 PROCEDURE — 83735 ASSAY OF MAGNESIUM: CPT

## 2018-05-23 PROCEDURE — 86901 BLOOD TYPING SEROLOGIC RH(D): CPT

## 2018-10-18 PROBLEM — I10 ESSENTIAL (PRIMARY) HYPERTENSION: Chronic | Status: ACTIVE | Noted: 2018-04-19

## 2018-10-18 PROBLEM — Z00.00 ENCOUNTER FOR PREVENTIVE HEALTH EXAMINATION: Status: ACTIVE | Noted: 2018-10-18

## 2018-10-18 PROBLEM — Z00.00 ENCOUNTER FOR PREVENTIVE HEALTH EXAMINATION: Noted: 2018-10-18

## 2018-11-15 NOTE — ED PROVIDER NOTE - NS ED MD DISPO ISOLATION TYPES
13, 1978); salpingectomy (Bilateral, July 8,1977); Cardioversion (10 times in past, last 12/12); Cardiac valuve replacement (June 19,2012); other surgical history (Jan. 2013); Tubal ligation; Ventricular ablation surgery (1/8/2015); Cardiac pacemaker placement (12/12/2017); and Pacemaker insertion (Left, 12/2017). CURRENT MEDICATIONS       Discharge Medication List as of 11/15/2018 11:34 AM      CONTINUE these medications which have NOT CHANGED    Details   valACYclovir (VALTREX) 1 g tablet 2 TABS BID FOR 1 DAY, Disp-4 tablet, R-0Normal      lisinopril (PRINIVIL;ZESTRIL) 10 MG tablet Take 1 tablet by mouth 2 times daily, Disp-180 tablet, R-1Normal      diltiazem (CARTIA XT) 240 MG extended release capsule Take 1 capsule by mouth 2 times daily, Disp-180 capsule, R-1Normal      ALPRAZolam (XANAX) 0.25 MG tablet Take 1 tablet by mouth every 8 hours as needed for Anxiety for up to 90 days. ., Disp-270 tablet, R-0Normal      diltiazem (CARDIZEM) 60 MG tablet TAKE 1 TABLET BY MOUTH UP TO THREE TIMES DAILY AS NEEDED FOR A-FIB, Disp-270 tablet, R-0, DAWNormal      clindamycin (CLEOCIN) 300 MG capsule Take 600 mg 1 hr prior to dental procedures, Disp-6 capsule, R-0Normal      propafenone (RYTHMOL) 150 MG tablet Take 0.5 tablets by mouth 3 times daily, Disp-135 tablet, R-1Normal      metroNIDAZOLE (METROGEL) 1 % gel APPLY DAILY TO AREAS OF ROSACEA, R-2, Historical Med      !! warfarin (COUMADIN) 5 MG tablet 1 po daily or as directed, Disp-100 tablet, R-3Normal      furosemide (LASIX) 20 MG tablet TAKE 1 TABLET EVERY DAY, Disp-90 tablet, R-1Normal      potassium chloride (KLOR-CON M) 20 MEQ extended release tablet TAKE 3 TABLETS EVERY MORNING AND 3 TABLETS EVERY EVENING, Disp-540 tablet, R-1Normal      !! warfarin (COUMADIN) 1 MG tablet TAKE 2 TO 3 TABLETS EVERY DAY AS DIRECTED  BY  INR, Disp-270 tablet, R-3Normal      Multiple Vitamins-Minerals (MULTIVITAMIN ADULT PO) Take 1 tablet by mouth daily      Handicap Placard MISC Absolute Lymph # 1.00 1.0 - 4.8 k/uL    Absolute Mono # 0.60 0.1 - 1.2 k/uL    Absolute Eos # 0.00 0.0 - 0.4 k/uL    Basophils # 0.00 0.0 - 0.2 k/uL   Basic Metabolic Panel   Result Value Ref Range    Glucose 108 (H) 70 - 99 mg/dL    BUN 14 8 - 23 mg/dL    CREATININE 0.95 (H) 0.50 - 0.90 mg/dL    Bun/Cre Ratio 15 9 - 20    Calcium 9.9 8.6 - 10.4 mg/dL    Sodium 138 135 - 144 mmol/L    Potassium 4.6 3.7 - 5.3 mmol/L    Chloride 99 98 - 107 mmol/L    CO2 29 20 - 31 mmol/L    Anion Gap 10 9 - 17 mmol/L    GFR Non-African American 59 (L) >60 mL/min    GFR African American >60 >60 mL/min    GFR Comment          GFR Staging NOT REPORTED    Troponin   Result Value Ref Range    Troponin T <0.03 <0.03 ng/mL    Troponin Interp         Amylase   Result Value Ref Range    Amylase 34 28 - 100 U/L   Lipase   Result Value Ref Range    Lipase 40 13 - 60 U/L   Hepatic Function Panel   Result Value Ref Range    Alb 4.4 3.5 - 5.2 g/dL    Alkaline Phosphatase 69 35 - 104 U/L    ALT 30 5 - 33 U/L    AST 30 <32 U/L    Total Bilirubin 0.39 0.3 - 1.2 mg/dL    Bilirubin, Direct 0.09 <0.31 mg/dL    Bilirubin, Indirect 0.30 0.00 - 1.00 mg/dL    Total Protein 8.2 6.4 - 8.3 g/dL    Globulin 3.8 1.5 - 3.8 g/dL    Albumin/Globulin Ratio 1.2 1.0 - 2.5   EKG 12 Lead   Result Value Ref Range    Ventricular Rate 65 BPM    Atrial Rate 65 BPM    P-R Interval 222 ms    QRS Duration 86 ms    Q-T Interval 428 ms    QTc Calculation (Bazett) 445 ms    P Axis 63 degrees    R Axis 21 degrees    T Axis -130 degrees       Not indicated unless otherwise documented above    RADIOLOGY:   I reviewedthe radiologist interpretations:    XR CHEST PORTABLE   Final Result   Stable nonacute chest.             Not indicated unless otherwise documented above    EMERGENCY DEPARTMENT COURSE:     The patient was given the following medications:  No orders of the defined types were placed in this encounter.        Vitals:   -------------------------  BP (!) 147/67   Pulse 63 Temp 97.6 °F (36.4 °C) (Tympanic)   Resp 16   Wt 124 lb (56.2 kg)   LMP 07/01/1998   SpO2 93%   BMI 22.68 kg/m²     Patient reevaluated multiple times. Resting comfortably in no acute distress blood pressure improved on reevaluation. 873 systolic. No chest pain or shortness of breath no headache no blurry vision no dizziness. Discharge to follow-up with cardiology call for an appointment today. I have reviewed the disposition diagnosis with the patient and or their family/guardian. I have answered their questions and given discharge instructions. They voiced understanding of these instructions and did not have any furtherquestions or complaints. CRITICAL CARE:    None    CONSULTS:    None    PROCEDURES:    None      OARRS Report if indicated             FINAL IMPRESSION      1.  Hypertension, unspecified type          DISPOSITION/PLAN   DISPOSITION          PATIENT REFERRED TO:  Cardiologist    Call today        DISCHARGE MEDICATIONS:  Discharge Medication List as of 11/15/2018 11:34 AM          (Please note that portions of thisnote were completed with a voice recognition program.  Efforts were made to edit the dictations but occasionally words are mis-transcribed.)    Altagracia Cuevas,,   Attending Emergency Physician       Altagracia Cuevas, [place]  11/15/18 3212 None

## 2019-04-15 ENCOUNTER — APPOINTMENT (OUTPATIENT)
Dept: OBGYN | Facility: CLINIC | Age: 64
End: 2019-04-15
Payer: MEDICARE

## 2019-04-15 ENCOUNTER — OUTPATIENT (OUTPATIENT)
Dept: OUTPATIENT SERVICES | Facility: HOSPITAL | Age: 64
LOS: 1 days | Discharge: HOME | End: 2019-04-15

## 2019-04-15 VITALS — HEIGHT: 66 IN | WEIGHT: 177 LBS | BODY MASS INDEX: 28.45 KG/M2

## 2019-04-15 DIAGNOSIS — Z98.890 OTHER SPECIFIED POSTPROCEDURAL STATES: Chronic | ICD-10-CM

## 2019-04-15 DIAGNOSIS — Z00.00 ENCOUNTER FOR GENERAL ADULT MEDICAL EXAMINATION WITHOUT ABNORMAL FINDINGS: ICD-10-CM

## 2019-04-15 LAB
BILIRUB UR QL STRIP: NORMAL
GLUCOSE UR-MCNC: NORMAL
HCG UR QL: NORMAL EU/DL
HGB UR QL STRIP.AUTO: NORMAL
KETONES UR-MCNC: NORMAL
LEUKOCYTE ESTERASE UR QL STRIP: NORMAL
NITRITE UR QL STRIP: NORMAL
PH UR STRIP: 5
PROT UR STRIP-MCNC: NORMAL
SP GR UR STRIP: 1.01

## 2019-04-15 PROCEDURE — 81003 URINALYSIS AUTO W/O SCOPE: CPT | Mod: QW

## 2019-04-15 PROCEDURE — 77085 DXA BONE DENSITY AXL VRT FX: CPT

## 2019-04-15 PROCEDURE — 99213 OFFICE O/P EST LOW 20 MIN: CPT

## 2019-04-23 LAB — HPV HIGH+LOW RISK DNA PNL CVX: NOT DETECTED

## 2020-10-26 ENCOUNTER — APPOINTMENT (OUTPATIENT)
Dept: OBGYN | Facility: CLINIC | Age: 65
End: 2020-10-26
Payer: MEDICARE

## 2020-10-26 VITALS — BODY MASS INDEX: 28.61 KG/M2 | WEIGHT: 178 LBS | HEIGHT: 66 IN | TEMPERATURE: 97.8 F

## 2020-10-26 LAB
BILIRUB UR QL STRIP: NORMAL
CLARITY UR: CLEAR
GLUCOSE UR-MCNC: NORMAL
HCG UR QL: NORMAL EU/DL
HGB UR QL STRIP.AUTO: NORMAL
KETONES UR-MCNC: NORMAL
LEUKOCYTE ESTERASE UR QL STRIP: NORMAL
NITRITE UR QL STRIP: NORMAL
PH UR STRIP: 7
PROT UR STRIP-MCNC: NORMAL
SP GR UR STRIP: 1.02

## 2020-10-26 PROCEDURE — 81003 URINALYSIS AUTO W/O SCOPE: CPT | Mod: QW

## 2020-10-26 PROCEDURE — G0101: CPT

## 2022-01-31 DIAGNOSIS — N30.00 ACUTE CYSTITIS W/OUT HEMATURIA: ICD-10-CM

## 2022-02-02 RX ORDER — NITROFURANTOIN (MONOHYDRATE/MACROCRYSTALS) 25; 75 MG/1; MG/1
100 CAPSULE ORAL
Qty: 14 | Refills: 2 | Status: ACTIVE | COMMUNITY
Start: 2022-01-31

## 2022-04-04 ENCOUNTER — APPOINTMENT (OUTPATIENT)
Dept: OBGYN | Facility: CLINIC | Age: 67
End: 2022-04-04
Payer: MEDICARE

## 2022-04-04 VITALS — HEIGHT: 66 IN | BODY MASS INDEX: 28.45 KG/M2 | WEIGHT: 177 LBS | TEMPERATURE: 97.7 F

## 2022-04-04 DIAGNOSIS — R10.2 PELVIC AND PERINEAL PAIN: ICD-10-CM

## 2022-04-04 PROCEDURE — 81003 URINALYSIS AUTO W/O SCOPE: CPT | Mod: QW

## 2022-04-04 PROCEDURE — 99213 OFFICE O/P EST LOW 20 MIN: CPT

## 2022-04-06 LAB
BILIRUB UR QL STRIP: NEGATIVE
GLUCOSE UR-MCNC: NEGATIVE
HCG UR QL: 0.2 EU/DL
HGB UR QL STRIP.AUTO: NEGATIVE
KETONES UR-MCNC: NEGATIVE
LEUKOCYTE ESTERASE UR QL STRIP: NORMAL
NITRITE UR QL STRIP: NEGATIVE
PH UR STRIP: 6
PROT UR STRIP-MCNC: NEGATIVE
SP GR UR STRIP: 1.01

## 2022-10-27 ENCOUNTER — APPOINTMENT (OUTPATIENT)
Dept: OBGYN | Facility: CLINIC | Age: 67
End: 2022-10-27

## 2022-12-06 NOTE — H&P ADULT - PROBLEM SELECTOR PLAN 1
Arava Counseling:  Patient counseled regarding adverse effects of Arava including but not limited to nausea, vomiting, abnormalities in liver function tests. Patients may develop mouth sores, rash, diarrhea, and abnormalities in blood counts. The patient understands that monitoring is required including LFTs and blood counts.  There is a rare possibility of scarring of the liver and lung problems that can occur when taking methotrexate. Persistent nausea, loss of appetite, pale stools, dark urine, cough, and shortness of breath should be reported immediately. Patient advised to discontinue Arava treatment and consult with a physician prior to attempting conception. The patient will have to undergo a treatment to eliminate Arava from the body prior to conception. Pt being admitted for post-surgical complication s/p L foot surgery  - podiatry following  - afebrile  - labs wnl  - c/w vanc/zosyn per podiatry recs  - tylenol PRN for pain control

## 2023-02-16 RX ORDER — NITROFURANTOIN MACROCRYSTALS 100 MG/1
100 CAPSULE ORAL
Qty: 14 | Refills: 1 | Status: ACTIVE | COMMUNITY
Start: 2023-02-16 | End: 1900-01-01

## 2023-04-17 ENCOUNTER — APPOINTMENT (OUTPATIENT)
Dept: OBGYN | Facility: CLINIC | Age: 68
End: 2023-04-17

## 2023-07-10 ENCOUNTER — APPOINTMENT (OUTPATIENT)
Dept: OBGYN | Facility: CLINIC | Age: 68
End: 2023-07-10
Payer: MEDICARE

## 2023-07-10 VITALS — BODY MASS INDEX: 31.24 KG/M2 | WEIGHT: 183 LBS | HEIGHT: 64 IN

## 2023-07-10 DIAGNOSIS — N90.5 ATROPHY OF VULVA: ICD-10-CM

## 2023-07-10 DIAGNOSIS — Z01.411 ENCOUNTER FOR GYNECOLOGICAL EXAMINATION (GENERAL) (ROUTINE) WITH ABNORMAL FINDINGS: ICD-10-CM

## 2023-07-10 DIAGNOSIS — N95.2 POSTMENOPAUSAL ATROPHIC VAGINITIS: ICD-10-CM

## 2023-07-10 LAB
BILIRUB UR QL STRIP: NORMAL
CLARITY UR: CLEAR
GLUCOSE UR-MCNC: NORMAL
HCG UR QL: 0.2 EU/DL
HGB UR QL STRIP.AUTO: NORMAL
KETONES UR-MCNC: NORMAL
LEUKOCYTE ESTERASE UR QL STRIP: NORMAL
NITRITE UR QL STRIP: NORMAL
PH UR STRIP: 7
PROT UR STRIP-MCNC: NORMAL
SP GR UR STRIP: 1.01

## 2023-07-10 PROCEDURE — G0101: CPT

## 2023-07-10 PROCEDURE — 81003 URINALYSIS AUTO W/O SCOPE: CPT | Mod: QW

## 2023-07-10 PROCEDURE — 77080 DXA BONE DENSITY AXIAL: CPT

## 2023-07-20 LAB — CYTOLOGY CVX/VAG DOC THIN PREP: NORMAL

## 2024-07-01 DIAGNOSIS — B37.9 CANDIDIASIS, UNSPECIFIED: ICD-10-CM

## 2024-07-01 RX ORDER — FLUCONAZOLE 150 MG/1
150 TABLET ORAL
Qty: 2 | Refills: 0 | Status: ACTIVE | COMMUNITY
Start: 2024-07-01 | End: 1900-01-01

## 2024-07-15 ENCOUNTER — APPOINTMENT (OUTPATIENT)
Dept: OBGYN | Facility: CLINIC | Age: 69
End: 2024-07-15
Payer: MEDICARE

## 2024-07-15 VITALS — HEIGHT: 65 IN | BODY MASS INDEX: 29.16 KG/M2 | WEIGHT: 175 LBS

## 2024-07-15 DIAGNOSIS — N90.5 ATROPHY OF VULVA: ICD-10-CM

## 2024-07-15 DIAGNOSIS — N95.2 POSTMENOPAUSAL ATROPHIC VAGINITIS: ICD-10-CM

## 2024-07-15 DIAGNOSIS — R39.15 URGENCY OF URINATION: ICD-10-CM

## 2024-07-15 PROCEDURE — 81003 URINALYSIS AUTO W/O SCOPE: CPT | Mod: QW

## 2024-07-15 PROCEDURE — 99213 OFFICE O/P EST LOW 20 MIN: CPT

## 2024-07-25 LAB
BILIRUB UR QL STRIP: NORMAL
GLUCOSE UR-MCNC: NORMAL
HCG UR QL: 0.2 EU/DL
HGB UR QL STRIP.AUTO: NORMAL
KETONES UR-MCNC: NORMAL
LEUKOCYTE ESTERASE UR QL STRIP: NORMAL
NITRITE UR QL STRIP: NORMAL
PH UR STRIP: 6
PROT UR STRIP-MCNC: NORMAL
SP GR UR STRIP: 1

## 2025-03-17 NOTE — ED ADULT TRIAGE NOTE - NS ED TRIAGE AVPU SCALE
Order placed.   Alert-The patient is alert, awake and responds to voice. The patient is oriented to time, place, and person. The triage nurse is able to obtain subjective information.

## 2025-05-14 NOTE — H&P ADULT - PROBLEM SELECTOR PLAN 3
ED PROVIDER NOTE  5/14/2025    CHIEF COMPLAINT:   Chief Complaint   Patient presents with    Back Pain     C/o low back pain that started on Monday. Hx chronic back pain. Denies injury.       HISTORY OF PRESENT ILLNESS:   Ervin Cordova is a 39 y.o. female who presents with chief complaint Back pain.  Patient reports worsening of her chronic low back pain that began 2 days ago.  Denies trauma or injury.  Denies bowel or bladder incontinence, numbness or weakness in extremities, saddle anesthesia.    The history is provided by the patient.         REVIEW OF SYSTEMS: as noted in the HPI.  NURSING NOTES REVIEWED      PAST MEDICAL/SURGICAL HISTORY:   Past Medical History:   Diagnosis Date    GERD (gastroesophageal reflux disease)       Past Surgical History:   Procedure Laterality Date    CHOLECYSTECTOMY         FAMILY HISTORY: No family history on file.    SOCIAL HISTORY: Social History[1]    ALLERGIES:   Review of patient's allergies indicates:   Allergen Reactions    Hydrocodone Other (See Comments)     Makes pain worse    Methocarbamol Nausea And Vomiting and Other (See Comments)       PHYSICAL EXAM:  Initial Vitals [05/14/25 1041]   BP Pulse Resp Temp SpO2   121/79 73 18 97.9 °F (36.6 °C) 99 %      MAP       --         Physical Exam    Nursing note and vitals reviewed.  Constitutional: She appears well-developed and well-nourished.   HENT:   Head: Normocephalic and atraumatic. Mouth/Throat: Uvula is midline and mucous membranes are normal.   Eyes: EOM are normal. Pupils are equal, round, and reactive to light.   Neck: Trachea normal. Neck supple.   Cardiovascular:  Normal rate, regular rhythm and normal pulses.           Pulmonary/Chest: Effort normal and breath sounds normal.   Abdominal: Abdomen is soft. Bowel sounds are normal. There is no rebound and no guarding.   Musculoskeletal:      Cervical back: Neck supple.      Lumbar back: Tenderness present.      Comments: Tenderness across low back in the lumbosacral  region     Neurological: She is alert and oriented to person, place, and time. GCS eye subscore is 4. GCS verbal subscore is 5. GCS motor subscore is 6.   Skin: Skin is warm and dry.   Psychiatric: She has a normal mood and affect. Her speech is normal. Thought content normal.         RESULTS:  Labs Reviewed - No data to display  Imaging Results    None         PROCEDURES:  Procedures    ECG:       ED COURSE AND MEDICAL DECISION MAKING:  Medications   cyclobenzaprine tablet 10 mg (10 mg Oral Given 5/14/25 1053)   ketorolac injection 30 mg (30 mg Intramuscular Given 5/14/25 1053)           Medical Decision Making  I estimate there is LOW risk for ABDOMINAL AORTIC ANEURYSM, CAUDA EQUINA SYNDROME, EPIDURAL MASS LESION, SPINAL STENOSIS, OR HERNIATED DISK CAUSING SEVERE STENOSIS, thus I consider the discharge disposition reasonable. We have discussed the diagnosis and risks, and we agree with discharging home to follow-up with their primary doctor. We also discussed returning to the Emergency Department immediately if new or worsening symptoms occur. We have discussed the symptoms which are most concerning (e.g., saddle anesthesia, urinary or bowel incontinence or retention, changing or worsening pain) that necessitate immediate return.  Given strict ED return precautions. I have spoken with the patient and/or caregivers. I have explained the patient's condition, diagnoses and treatment plan based on the information available to me at this time. I have answered the patient's and/or caregiver's questions and addressed any concerns. The patient and/or caregivers have as good an understanding of the patient's diagnosis, condition and treatment plan as can be expected at this point. The vital signs have been stable. The patient's condition is stable and appropriate for discharge from the emergency department.     The patient will pursue further outpatient evaluation with the primary care physician or other designated or  consulting physician as outlined in the discharge instructions. The patient and/or caregivers are agreeable to this plan of care and follow-up instructions have been explained in detail. The patient and/or caregivers have received these instructions in written format and have expressed an understanding of the discharge instructions. The patient and/or caregivers are aware that any significant change in condition or worsening of symptoms should prompt an immediate return to this or the closest emergency department or a call to 911.    Amount and/or Complexity of Data Reviewed  External Data Reviewed: radiology and notes.    Risk  OTC drugs.  Prescription drug management.        CLINICAL IMPRESSION:  1. Chronic bilateral low back pain without sciatica        DISPOSITION:   ED Disposition Condition    Discharge Stable            ED Prescriptions       Medication Sig Dispense Start Date End Date Auth. Provider    methylPREDNISolone (MEDROL DOSEPACK) 4 mg tablet use as directed 21 each 5/14/2025 -- Omar Carpenter DO    cyclobenzaprine (FLEXERIL) 10 MG tablet Take 1 tablet (10 mg total) by mouth 3 (three) times daily as needed for Muscle spasms. 15 tablet 5/14/2025 5/19/2025 Omar Carpenter DO          Follow-up Information       Follow up With Specialties Details Why Contact Info    Gabriella Mitchell FNP Family Medicine Schedule an appointment as soon as possible for a visit   [address] [address]  [address]  [facility] [address]  [phone]      [facility] - Emergency Dept Emergency Medicine  If symptoms worsen [address]  [facility] [address]  [phone]                 [1]   Social History  Tobacco Use    Smoking status: Never    Smokeless tobacco: Never        Omar Carpenter DO  05/14/25 8808     IMPROVE 0; low vte risk, no chemoprophylaxis

## 2025-05-22 RX ORDER — KETOCONAZOLE 20 MG/G
2 CREAM TOPICAL TWICE DAILY
Qty: 1 | Refills: 0 | Status: ACTIVE | COMMUNITY
Start: 2025-05-22 | End: 1900-01-01

## 2025-07-21 ENCOUNTER — APPOINTMENT (OUTPATIENT)
Dept: OBGYN | Facility: CLINIC | Age: 70
End: 2025-07-21